# Patient Record
Sex: FEMALE | Race: BLACK OR AFRICAN AMERICAN | URBAN - METROPOLITAN AREA
[De-identification: names, ages, dates, MRNs, and addresses within clinical notes are randomized per-mention and may not be internally consistent; named-entity substitution may affect disease eponyms.]

---

## 2024-01-27 ENCOUNTER — HOSPITAL ENCOUNTER (INPATIENT)
Age: 69
LOS: 3 days | Discharge: HOME HEALTH | DRG: 371 | End: 2024-01-31
Attending: EMERGENCY MEDICINE | Admitting: INTERNAL MEDICINE
Payer: MEDICARE

## 2024-01-27 ENCOUNTER — APPOINTMENT (OUTPATIENT)
Dept: CT IMAGING | Age: 69
DRG: 371 | End: 2024-01-27
Payer: MEDICARE

## 2024-01-27 ENCOUNTER — APPOINTMENT (OUTPATIENT)
Dept: GENERAL RADIOLOGY | Age: 69
DRG: 371 | End: 2024-01-27
Payer: MEDICARE

## 2024-01-27 DIAGNOSIS — I48.91 ATRIAL FIBRILLATION WITH RVR (HCC): Primary | ICD-10-CM

## 2024-01-27 DIAGNOSIS — K52.9 COLITIS: ICD-10-CM

## 2024-01-27 LAB
ALBUMIN SERPL-MCNC: 3.3 G/DL (ref 3.4–5)
ALBUMIN/GLOB SERPL: 1 {RATIO} (ref 1.1–2.2)
ALP SERPL-CCNC: 124 U/L (ref 40–129)
ALT SERPL-CCNC: 11 U/L (ref 10–40)
ANION GAP SERPL CALCULATED.3IONS-SCNC: 11 MMOL/L (ref 3–16)
APTT BLD: 29.8 SEC (ref 22.7–35.9)
AST SERPL-CCNC: 22 U/L (ref 15–37)
BASOPHILS # BLD: 0 K/UL (ref 0–0.2)
BASOPHILS NFR BLD: 0.6 %
BILIRUB SERPL-MCNC: 0.4 MG/DL (ref 0–1)
BUN SERPL-MCNC: 7 MG/DL (ref 7–20)
CALCIUM SERPL-MCNC: 8.9 MG/DL (ref 8.3–10.6)
CHLORIDE SERPL-SCNC: 105 MMOL/L (ref 99–110)
CO2 SERPL-SCNC: 20 MMOL/L (ref 21–32)
CREAT SERPL-MCNC: 0.7 MG/DL (ref 0.6–1.2)
DEPRECATED RDW RBC AUTO: 15.4 % (ref 12.4–15.4)
EOSINOPHIL # BLD: 0.1 K/UL (ref 0–0.6)
EOSINOPHIL NFR BLD: 1.2 %
GFR SERPLBLD CREATININE-BSD FMLA CKD-EPI: >60 ML/MIN/{1.73_M2}
GLUCOSE SERPL-MCNC: 147 MG/DL (ref 70–99)
HCT VFR BLD AUTO: 42 % (ref 36–48)
HEMOCCULT STL QL: NORMAL
HGB BLD-MCNC: 14 G/DL (ref 12–16)
INR PPP: 1.2 (ref 0.84–1.16)
LACTATE BLDV-SCNC: 1.4 MMOL/L (ref 0.4–1.9)
LIPASE SERPL-CCNC: 19 U/L (ref 13–60)
LYMPHOCYTES # BLD: 1.9 K/UL (ref 1–5.1)
LYMPHOCYTES NFR BLD: 36.2 %
MCH RBC QN AUTO: 29.9 PG (ref 26–34)
MCHC RBC AUTO-ENTMCNC: 33.3 G/DL (ref 31–36)
MCV RBC AUTO: 89.9 FL (ref 80–100)
MONOCYTES # BLD: 0.5 K/UL (ref 0–1.3)
MONOCYTES NFR BLD: 8.8 %
NEUTROPHILS # BLD: 2.7 K/UL (ref 1.7–7.7)
NEUTROPHILS NFR BLD: 53.2 %
NT-PROBNP SERPL-MCNC: 3872 PG/ML (ref 0–124)
PLATELET # BLD AUTO: 139 K/UL (ref 135–450)
PMV BLD AUTO: 8.7 FL (ref 5–10.5)
POTASSIUM SERPL-SCNC: 4 MMOL/L (ref 3.5–5.1)
PROT SERPL-MCNC: 6.6 G/DL (ref 6.4–8.2)
PROTHROMBIN TIME: 15.2 SEC (ref 11.5–14.8)
RBC # BLD AUTO: 4.67 M/UL (ref 4–5.2)
SODIUM SERPL-SCNC: 136 MMOL/L (ref 136–145)
TROPONIN, HIGH SENSITIVITY: 21 NG/L (ref 0–14)
WBC # BLD AUTO: 5.2 K/UL (ref 4–11)

## 2024-01-27 PROCEDURE — 85730 THROMBOPLASTIN TIME PARTIAL: CPT

## 2024-01-27 PROCEDURE — 83605 ASSAY OF LACTIC ACID: CPT

## 2024-01-27 PROCEDURE — 2580000003 HC RX 258: Performed by: PHYSICIAN ASSISTANT

## 2024-01-27 PROCEDURE — 6360000004 HC RX CONTRAST MEDICATION: Performed by: PHYSICIAN ASSISTANT

## 2024-01-27 PROCEDURE — 74177 CT ABD & PELVIS W/CONTRAST: CPT

## 2024-01-27 PROCEDURE — 85610 PROTHROMBIN TIME: CPT

## 2024-01-27 PROCEDURE — 6360000002 HC RX W HCPCS: Performed by: PHYSICIAN ASSISTANT

## 2024-01-27 PROCEDURE — 93005 ELECTROCARDIOGRAM TRACING: CPT | Performed by: EMERGENCY MEDICINE

## 2024-01-27 PROCEDURE — 85025 COMPLETE CBC W/AUTO DIFF WBC: CPT

## 2024-01-27 PROCEDURE — 82270 OCCULT BLOOD FECES: CPT

## 2024-01-27 PROCEDURE — 2500000003 HC RX 250 WO HCPCS: Performed by: PHYSICIAN ASSISTANT

## 2024-01-27 PROCEDURE — 99285 EMERGENCY DEPT VISIT HI MDM: CPT

## 2024-01-27 PROCEDURE — 96375 TX/PRO/DX INJ NEW DRUG ADDON: CPT

## 2024-01-27 PROCEDURE — 83880 ASSAY OF NATRIURETIC PEPTIDE: CPT

## 2024-01-27 PROCEDURE — 96374 THER/PROPH/DIAG INJ IV PUSH: CPT

## 2024-01-27 PROCEDURE — 71045 X-RAY EXAM CHEST 1 VIEW: CPT

## 2024-01-27 PROCEDURE — 80053 COMPREHEN METABOLIC PANEL: CPT

## 2024-01-27 PROCEDURE — 83690 ASSAY OF LIPASE: CPT

## 2024-01-27 PROCEDURE — 84484 ASSAY OF TROPONIN QUANT: CPT

## 2024-01-27 RX ORDER — MORPHINE SULFATE 4 MG/ML
4 INJECTION, SOLUTION INTRAMUSCULAR; INTRAVENOUS EVERY 30 MIN PRN
Status: DISCONTINUED | OUTPATIENT
Start: 2024-01-27 | End: 2024-01-28

## 2024-01-27 RX ORDER — DILTIAZEM HYDROCHLORIDE 5 MG/ML
10 INJECTION INTRAVENOUS ONCE
Status: COMPLETED | OUTPATIENT
Start: 2024-01-27 | End: 2024-01-27

## 2024-01-27 RX ORDER — ONDANSETRON 2 MG/ML
4 INJECTION INTRAMUSCULAR; INTRAVENOUS EVERY 6 HOURS PRN
Status: DISCONTINUED | OUTPATIENT
Start: 2024-01-27 | End: 2024-01-28

## 2024-01-27 RX ORDER — 0.9 % SODIUM CHLORIDE 0.9 %
500 INTRAVENOUS SOLUTION INTRAVENOUS ONCE
Status: COMPLETED | OUTPATIENT
Start: 2024-01-27 | End: 2024-01-27

## 2024-01-27 RX ADMIN — ONDANSETRON 4 MG: 2 INJECTION INTRAMUSCULAR; INTRAVENOUS at 22:22

## 2024-01-27 RX ADMIN — DILTIAZEM HYDROCHLORIDE 10 MG: 5 INJECTION, SOLUTION INTRAVENOUS at 23:13

## 2024-01-27 RX ADMIN — IOPAMIDOL 75 ML: 755 INJECTION, SOLUTION INTRAVENOUS at 23:19

## 2024-01-27 RX ADMIN — MORPHINE SULFATE 4 MG: 4 INJECTION, SOLUTION INTRAMUSCULAR; INTRAVENOUS at 22:23

## 2024-01-27 RX ADMIN — SODIUM CHLORIDE 500 ML: 9 INJECTION, SOLUTION INTRAVENOUS at 22:20

## 2024-01-27 ASSESSMENT — LIFESTYLE VARIABLES
HOW OFTEN DO YOU HAVE A DRINK CONTAINING ALCOHOL: NEVER
HOW MANY STANDARD DRINKS CONTAINING ALCOHOL DO YOU HAVE ON A TYPICAL DAY: PATIENT DOES NOT DRINK

## 2024-01-27 ASSESSMENT — PAIN SCALES - GENERAL: PAINLEVEL_OUTOF10: 8

## 2024-01-27 ASSESSMENT — PAIN DESCRIPTION - LOCATION: LOCATION: ABDOMEN;CHEST

## 2024-01-28 PROBLEM — K52.9 COLITIS: Status: ACTIVE | Noted: 2024-01-28

## 2024-01-28 LAB
ANION GAP SERPL CALCULATED.3IONS-SCNC: 9 MMOL/L (ref 3–16)
BACTERIA URNS QL MICRO: NORMAL /HPF
BILIRUB UR QL STRIP.AUTO: NEGATIVE
BUN SERPL-MCNC: 6 MG/DL (ref 7–20)
CALCIUM SERPL-MCNC: 8.4 MG/DL (ref 8.3–10.6)
CHLORIDE SERPL-SCNC: 109 MMOL/L (ref 99–110)
CLARITY UR: CLEAR
CO2 SERPL-SCNC: 20 MMOL/L (ref 21–32)
COLOR UR: YELLOW
CREAT SERPL-MCNC: 0.8 MG/DL (ref 0.6–1.2)
DEPRECATED RDW RBC AUTO: 15.5 % (ref 12.4–15.4)
EKG ATRIAL RATE: 352 BPM
EKG DIAGNOSIS: NORMAL
EKG P AXIS: 94 DEGREES
EKG Q-T INTERVAL: 306 MS
EKG QRS DURATION: 80 MS
EKG QTC CALCULATION (BAZETT): 476 MS
EKG R AXIS: -1 DEGREES
EKG T AXIS: 90 DEGREES
EKG VENTRICULAR RATE: 146 BPM
EPI CELLS #/AREA URNS AUTO: 4 /HPF (ref 0–5)
GFR SERPLBLD CREATININE-BSD FMLA CKD-EPI: >60 ML/MIN/{1.73_M2}
GLUCOSE BLD-MCNC: 114 MG/DL (ref 70–99)
GLUCOSE BLD-MCNC: 81 MG/DL (ref 70–99)
GLUCOSE BLD-MCNC: 93 MG/DL (ref 70–99)
GLUCOSE BLD-MCNC: 93 MG/DL (ref 70–99)
GLUCOSE SERPL-MCNC: 106 MG/DL (ref 70–99)
GLUCOSE UR STRIP.AUTO-MCNC: NEGATIVE MG/DL
HCT VFR BLD AUTO: 40.2 % (ref 36–48)
HGB BLD-MCNC: 13.1 G/DL (ref 12–16)
HGB UR QL STRIP.AUTO: ABNORMAL
HYALINE CASTS #/AREA URNS AUTO: 1 /LPF (ref 0–8)
KETONES UR STRIP.AUTO-MCNC: NEGATIVE MG/DL
LEUKOCYTE ESTERASE UR QL STRIP.AUTO: NEGATIVE
MCH RBC QN AUTO: 29.7 PG (ref 26–34)
MCHC RBC AUTO-ENTMCNC: 32.7 G/DL (ref 31–36)
MCV RBC AUTO: 90.8 FL (ref 80–100)
NITRITE UR QL STRIP.AUTO: NEGATIVE
PERFORMED ON: ABNORMAL
PERFORMED ON: NORMAL
PH UR STRIP.AUTO: 7 [PH] (ref 5–8)
PLATELET # BLD AUTO: 128 K/UL (ref 135–450)
PMV BLD AUTO: 8.3 FL (ref 5–10.5)
POTASSIUM SERPL-SCNC: 3.9 MMOL/L (ref 3.5–5.1)
PROT UR STRIP.AUTO-MCNC: NEGATIVE MG/DL
RBC # BLD AUTO: 4.43 M/UL (ref 4–5.2)
RBC CLUMPS #/AREA URNS AUTO: 2 /HPF (ref 0–4)
SODIUM SERPL-SCNC: 138 MMOL/L (ref 136–145)
SP GR UR STRIP.AUTO: >=1.03 (ref 1–1.03)
T4 FREE SERPL-MCNC: 1.1 NG/DL (ref 0.9–1.8)
TROPONIN, HIGH SENSITIVITY: 20 NG/L (ref 0–14)
TSH SERPL DL<=0.005 MIU/L-ACNC: 4.26 UIU/ML (ref 0.27–4.2)
UA COMPLETE W REFLEX CULTURE PNL UR: ABNORMAL
UA DIPSTICK W REFLEX MICRO PNL UR: YES
URN SPEC COLLECT METH UR: ABNORMAL
UROBILINOGEN UR STRIP-ACNC: 1 E.U./DL
WBC # BLD AUTO: 4.7 K/UL (ref 4–11)
WBC #/AREA URNS AUTO: 2 /HPF (ref 0–5)

## 2024-01-28 PROCEDURE — 84443 ASSAY THYROID STIM HORMONE: CPT

## 2024-01-28 PROCEDURE — 6360000002 HC RX W HCPCS: Performed by: EMERGENCY MEDICINE

## 2024-01-28 PROCEDURE — 2580000003 HC RX 258: Performed by: STUDENT IN AN ORGANIZED HEALTH CARE EDUCATION/TRAINING PROGRAM

## 2024-01-28 PROCEDURE — 6370000000 HC RX 637 (ALT 250 FOR IP): Performed by: STUDENT IN AN ORGANIZED HEALTH CARE EDUCATION/TRAINING PROGRAM

## 2024-01-28 PROCEDURE — 6360000002 HC RX W HCPCS: Performed by: PHYSICIAN ASSISTANT

## 2024-01-28 PROCEDURE — 36415 COLL VENOUS BLD VENIPUNCTURE: CPT

## 2024-01-28 PROCEDURE — 84439 ASSAY OF FREE THYROXINE: CPT

## 2024-01-28 PROCEDURE — 93010 ELECTROCARDIOGRAM REPORT: CPT | Performed by: INTERNAL MEDICINE

## 2024-01-28 PROCEDURE — 99223 1ST HOSP IP/OBS HIGH 75: CPT | Performed by: INTERNAL MEDICINE

## 2024-01-28 PROCEDURE — 2580000003 HC RX 258: Performed by: PHYSICIAN ASSISTANT

## 2024-01-28 PROCEDURE — 6370000000 HC RX 637 (ALT 250 FOR IP): Performed by: PHYSICIAN ASSISTANT

## 2024-01-28 PROCEDURE — 85027 COMPLETE CBC AUTOMATED: CPT

## 2024-01-28 PROCEDURE — 1200000000 HC SEMI PRIVATE

## 2024-01-28 PROCEDURE — 2500000003 HC RX 250 WO HCPCS: Performed by: PHYSICIAN ASSISTANT

## 2024-01-28 PROCEDURE — 81001 URINALYSIS AUTO W/SCOPE: CPT

## 2024-01-28 PROCEDURE — 80048 BASIC METABOLIC PNL TOTAL CA: CPT

## 2024-01-28 RX ORDER — ONDANSETRON 2 MG/ML
4 INJECTION INTRAMUSCULAR; INTRAVENOUS EVERY 6 HOURS PRN
Status: DISCONTINUED | OUTPATIENT
Start: 2024-01-28 | End: 2024-01-31 | Stop reason: HOSPADM

## 2024-01-28 RX ORDER — INSULIN LISPRO 100 [IU]/ML
0-4 INJECTION, SOLUTION INTRAVENOUS; SUBCUTANEOUS NIGHTLY
Status: DISCONTINUED | OUTPATIENT
Start: 2024-01-28 | End: 2024-01-31 | Stop reason: HOSPADM

## 2024-01-28 RX ORDER — DULOXETIN HYDROCHLORIDE 20 MG/1
20 CAPSULE, DELAYED RELEASE ORAL DAILY
COMMUNITY
Start: 2023-11-21

## 2024-01-28 RX ORDER — INSULIN GLARGINE 100 [IU]/ML
15 INJECTION, SOLUTION SUBCUTANEOUS NIGHTLY
Status: DISCONTINUED | OUTPATIENT
Start: 2024-01-28 | End: 2024-01-31 | Stop reason: HOSPADM

## 2024-01-28 RX ORDER — ACETAMINOPHEN 325 MG/1
650 TABLET ORAL EVERY 6 HOURS PRN
Status: DISCONTINUED | OUTPATIENT
Start: 2024-01-28 | End: 2024-01-31 | Stop reason: HOSPADM

## 2024-01-28 RX ORDER — DILTIAZEM HCL IN NACL,ISO-OSM 125 MG/125
2.5-15 PLASTIC BAG, INJECTION (ML) INTRAVENOUS CONTINUOUS
Status: DISCONTINUED | OUTPATIENT
Start: 2024-01-28 | End: 2024-01-28

## 2024-01-28 RX ORDER — ROSUVASTATIN CALCIUM 20 MG/1
20 TABLET, COATED ORAL NIGHTLY
Status: DISCONTINUED | OUTPATIENT
Start: 2024-01-28 | End: 2024-01-31 | Stop reason: HOSPADM

## 2024-01-28 RX ORDER — METOPROLOL SUCCINATE 25 MG/1
25 TABLET, EXTENDED RELEASE ORAL DAILY
Status: DISCONTINUED | OUTPATIENT
Start: 2024-01-28 | End: 2024-01-28

## 2024-01-28 RX ORDER — MORPHINE SULFATE 4 MG/ML
4 INJECTION, SOLUTION INTRAMUSCULAR; INTRAVENOUS
Status: DISCONTINUED | OUTPATIENT
Start: 2024-01-28 | End: 2024-01-31 | Stop reason: HOSPADM

## 2024-01-28 RX ORDER — FLUTICASONE PROPIONATE 50 MCG
1 SPRAY, SUSPENSION (ML) NASAL DAILY
COMMUNITY
Start: 2022-11-23

## 2024-01-28 RX ORDER — DULOXETIN HYDROCHLORIDE 20 MG/1
20 CAPSULE, DELAYED RELEASE ORAL DAILY
Status: DISCONTINUED | OUTPATIENT
Start: 2024-01-28 | End: 2024-01-31 | Stop reason: HOSPADM

## 2024-01-28 RX ORDER — FUROSEMIDE 20 MG/1
20 TABLET ORAL DAILY
Status: DISCONTINUED | OUTPATIENT
Start: 2024-01-28 | End: 2024-01-29

## 2024-01-28 RX ORDER — ASPIRIN 81 MG/1
81 TABLET ORAL DAILY
Status: DISCONTINUED | OUTPATIENT
Start: 2024-01-28 | End: 2024-01-31 | Stop reason: HOSPADM

## 2024-01-28 RX ORDER — LACTOBACILLUS RHAMNOSUS GG 10B CELL
1 CAPSULE ORAL 2 TIMES DAILY WITH MEALS
Status: DISCONTINUED | OUTPATIENT
Start: 2024-01-28 | End: 2024-01-31 | Stop reason: HOSPADM

## 2024-01-28 RX ORDER — METRONIDAZOLE 500 MG/100ML
500 INJECTION, SOLUTION INTRAVENOUS EVERY 8 HOURS
Status: DISCONTINUED | OUTPATIENT
Start: 2024-01-28 | End: 2024-01-30

## 2024-01-28 RX ORDER — SPIRONOLACTONE 25 MG/1
25 TABLET ORAL DAILY
Status: DISCONTINUED | OUTPATIENT
Start: 2024-01-28 | End: 2024-01-31 | Stop reason: HOSPADM

## 2024-01-28 RX ORDER — MORPHINE SULFATE 2 MG/ML
2 INJECTION, SOLUTION INTRAMUSCULAR; INTRAVENOUS
Status: DISCONTINUED | OUTPATIENT
Start: 2024-01-28 | End: 2024-01-31 | Stop reason: HOSPADM

## 2024-01-28 RX ORDER — INSULIN LISPRO 100 [IU]/ML
0-4 INJECTION, SOLUTION INTRAVENOUS; SUBCUTANEOUS
Status: DISCONTINUED | OUTPATIENT
Start: 2024-01-28 | End: 2024-01-31 | Stop reason: HOSPADM

## 2024-01-28 RX ORDER — DILTIAZEM HYDROCHLORIDE 5 MG/ML
5 INJECTION INTRAVENOUS ONCE
Status: DISCONTINUED | OUTPATIENT
Start: 2024-01-28 | End: 2024-01-28

## 2024-01-28 RX ORDER — TRAZODONE HYDROCHLORIDE 100 MG/1
100 TABLET ORAL NIGHTLY
COMMUNITY
Start: 2023-08-25

## 2024-01-28 RX ORDER — SENNOSIDES A AND B 8.6 MG/1
1 TABLET, FILM COATED ORAL DAILY PRN
Status: DISCONTINUED | OUTPATIENT
Start: 2024-01-28 | End: 2024-01-31 | Stop reason: HOSPADM

## 2024-01-28 RX ORDER — 0.9 % SODIUM CHLORIDE 0.9 %
1000 INTRAVENOUS SOLUTION INTRAVENOUS ONCE
Status: COMPLETED | OUTPATIENT
Start: 2024-01-28 | End: 2024-01-28

## 2024-01-28 RX ORDER — FUROSEMIDE 20 MG/1
20 TABLET ORAL DAILY
COMMUNITY
Start: 2021-09-15

## 2024-01-28 RX ORDER — LEVOFLOXACIN 5 MG/ML
750 INJECTION, SOLUTION INTRAVENOUS EVERY 24 HOURS
Status: DISCONTINUED | OUTPATIENT
Start: 2024-01-28 | End: 2024-01-30

## 2024-01-28 RX ORDER — SODIUM CHLORIDE 9 MG/ML
INJECTION, SOLUTION INTRAVENOUS PRN
Status: DISCONTINUED | OUTPATIENT
Start: 2024-01-28 | End: 2024-01-31 | Stop reason: HOSPADM

## 2024-01-28 RX ORDER — HYDROXYZINE HYDROCHLORIDE 25 MG/1
25 TABLET, FILM COATED ORAL EVERY 8 HOURS PRN
Status: DISCONTINUED | OUTPATIENT
Start: 2024-01-28 | End: 2024-01-28

## 2024-01-28 RX ORDER — ROSUVASTATIN CALCIUM 20 MG/1
20 TABLET, COATED ORAL NIGHTLY
COMMUNITY
Start: 2024-01-18

## 2024-01-28 RX ORDER — HYDROXYZINE HYDROCHLORIDE 25 MG/1
25 TABLET, FILM COATED ORAL EVERY 8 HOURS PRN
Status: ON HOLD | COMMUNITY
Start: 2021-05-03 | End: 2024-01-31 | Stop reason: HOSPADM

## 2024-01-28 RX ORDER — FAMOTIDINE 20 MG/1
20 TABLET, FILM COATED ORAL 2 TIMES DAILY
Status: DISCONTINUED | OUTPATIENT
Start: 2024-01-28 | End: 2024-01-31 | Stop reason: HOSPADM

## 2024-01-28 RX ORDER — FLUTICASONE PROPIONATE 50 MCG
1 SPRAY, SUSPENSION (ML) NASAL DAILY
Status: DISCONTINUED | OUTPATIENT
Start: 2024-01-28 | End: 2024-01-31 | Stop reason: HOSPADM

## 2024-01-28 RX ORDER — SPIRONOLACTONE 25 MG/1
25 TABLET ORAL DAILY
COMMUNITY
Start: 2021-08-03

## 2024-01-28 RX ORDER — SODIUM CHLORIDE 0.9 % (FLUSH) 0.9 %
10 SYRINGE (ML) INJECTION PRN
Status: DISCONTINUED | OUTPATIENT
Start: 2024-01-28 | End: 2024-01-31 | Stop reason: HOSPADM

## 2024-01-28 RX ORDER — METRONIDAZOLE 500 MG/100ML
500 INJECTION, SOLUTION INTRAVENOUS ONCE
Status: COMPLETED | OUTPATIENT
Start: 2024-01-28 | End: 2024-01-28

## 2024-01-28 RX ORDER — DEXTROSE MONOHYDRATE 100 MG/ML
INJECTION, SOLUTION INTRAVENOUS CONTINUOUS PRN
Status: DISCONTINUED | OUTPATIENT
Start: 2024-01-28 | End: 2024-01-31 | Stop reason: HOSPADM

## 2024-01-28 RX ORDER — GLUCAGON 1 MG/ML
1 KIT INJECTION PRN
Status: DISCONTINUED | OUTPATIENT
Start: 2024-01-28 | End: 2024-01-31 | Stop reason: HOSPADM

## 2024-01-28 RX ORDER — DILTIAZEM HCL IN NACL,ISO-OSM 125 MG/125
5-15 PLASTIC BAG, INJECTION (ML) INTRAVENOUS CONTINUOUS
Status: CANCELLED | OUTPATIENT
Start: 2024-01-28

## 2024-01-28 RX ORDER — ASPIRIN 81 MG/1
81 TABLET ORAL DAILY
COMMUNITY
Start: 2023-08-25

## 2024-01-28 RX ORDER — SODIUM CHLORIDE 0.9 % (FLUSH) 0.9 %
10 SYRINGE (ML) INJECTION EVERY 12 HOURS SCHEDULED
Status: DISCONTINUED | OUTPATIENT
Start: 2024-01-28 | End: 2024-01-31 | Stop reason: HOSPADM

## 2024-01-28 RX ORDER — MIDODRINE HYDROCHLORIDE 5 MG/1
5 TABLET ORAL
Status: DISCONTINUED | OUTPATIENT
Start: 2024-01-28 | End: 2024-01-31 | Stop reason: HOSPADM

## 2024-01-28 RX ORDER — LEVOFLOXACIN 5 MG/ML
750 INJECTION, SOLUTION INTRAVENOUS ONCE
Status: DISCONTINUED | OUTPATIENT
Start: 2024-01-28 | End: 2024-01-28

## 2024-01-28 RX ORDER — TRAZODONE HYDROCHLORIDE 50 MG/1
100 TABLET ORAL NIGHTLY
Status: DISCONTINUED | OUTPATIENT
Start: 2024-01-28 | End: 2024-01-31 | Stop reason: HOSPADM

## 2024-01-28 RX ORDER — FUROSEMIDE 10 MG/ML
40 INJECTION INTRAMUSCULAR; INTRAVENOUS ONCE
Status: DISCONTINUED | OUTPATIENT
Start: 2024-01-28 | End: 2024-01-29

## 2024-01-28 RX ORDER — ACETAMINOPHEN 650 MG/1
650 SUPPOSITORY RECTAL EVERY 6 HOURS PRN
Status: DISCONTINUED | OUTPATIENT
Start: 2024-01-28 | End: 2024-01-31 | Stop reason: HOSPADM

## 2024-01-28 RX ORDER — METOPROLOL SUCCINATE 25 MG/1
25 TABLET, EXTENDED RELEASE ORAL DAILY
COMMUNITY
Start: 2021-08-03

## 2024-01-28 RX ADMIN — METRONIDAZOLE 500 MG: 500 INJECTION, SOLUTION INTRAVENOUS at 09:01

## 2024-01-28 RX ADMIN — MIDODRINE HYDROCHLORIDE 5 MG: 5 TABLET ORAL at 17:35

## 2024-01-28 RX ADMIN — FAMOTIDINE 20 MG: 20 TABLET ORAL at 20:26

## 2024-01-28 RX ADMIN — MIDODRINE HYDROCHLORIDE 5 MG: 5 TABLET ORAL at 12:11

## 2024-01-28 RX ADMIN — Medication 1 CAPSULE: at 08:59

## 2024-01-28 RX ADMIN — METRONIDAZOLE 500 MG: 500 INJECTION, SOLUTION INTRAVENOUS at 17:35

## 2024-01-28 RX ADMIN — Medication 5 MG/HR: at 04:16

## 2024-01-28 RX ADMIN — METRONIDAZOLE 500 MG: 500 INJECTION, SOLUTION INTRAVENOUS at 01:03

## 2024-01-28 RX ADMIN — SODIUM CHLORIDE 1000 ML: 9 INJECTION, SOLUTION INTRAVENOUS at 10:33

## 2024-01-28 RX ADMIN — DULOXETINE HYDROCHLORIDE 20 MG: 20 CAPSULE, DELAYED RELEASE ORAL at 08:59

## 2024-01-28 RX ADMIN — Medication 10 ML: at 08:59

## 2024-01-28 RX ADMIN — Medication 1 CAPSULE: at 17:35

## 2024-01-28 RX ADMIN — TRAZODONE HYDROCHLORIDE 100 MG: 50 TABLET ORAL at 20:26

## 2024-01-28 RX ADMIN — METOPROLOL TARTRATE 12.5 MG: 25 TABLET, FILM COATED ORAL at 20:26

## 2024-01-28 RX ADMIN — EMPAGLIFLOZIN 10 MG: 10 TABLET, FILM COATED ORAL at 08:58

## 2024-01-28 RX ADMIN — LEVOFLOXACIN 750 MG: 5 INJECTION, SOLUTION INTRAVENOUS at 04:17

## 2024-01-28 RX ADMIN — APIXABAN 5 MG: 5 TABLET, FILM COATED ORAL at 08:59

## 2024-01-28 RX ADMIN — ROSUVASTATIN CALCIUM 20 MG: 20 TABLET, FILM COATED ORAL at 20:26

## 2024-01-28 RX ADMIN — Medication 5 MG/HR: at 01:01

## 2024-01-28 RX ADMIN — Medication 10 ML: at 20:27

## 2024-01-28 RX ADMIN — FAMOTIDINE 20 MG: 20 TABLET ORAL at 08:58

## 2024-01-28 ASSESSMENT — PAIN DESCRIPTION - ORIENTATION
ORIENTATION: LEFT
ORIENTATION: RIGHT

## 2024-01-28 ASSESSMENT — PAIN SCALES - GENERAL
PAINLEVEL_OUTOF10: 0
PAINLEVEL_OUTOF10: 6
PAINLEVEL_OUTOF10: 3
PAINLEVEL_OUTOF10: 0
PAINLEVEL_OUTOF10: 0

## 2024-01-28 ASSESSMENT — PAIN DESCRIPTION - LOCATION
LOCATION: CHEST
LOCATION: ABDOMEN

## 2024-01-28 NOTE — PLAN OF CARE
Problem: Pain  Goal: Verbalizes/displays adequate comfort level or baseline comfort level  1/28/2024 1728 by Rosa Delgadillo, RN  Outcome: Progressing   Pt can rate pain on a 0-10 scale. Pt pain will remain at a level that is tolerable to pt. PRN pain medication as needed.     Problem: Safety - Adult  Goal: Free from fall injury  1/28/2024 1728 by Rosa Delgadillo, RN  Outcome: Progressing   Pt will remain free from falls. Fall precautions in place and alarm engaged. Bedside table and call light within reach. Pt aware to use call light for assistance ambulating.

## 2024-01-28 NOTE — H&P
[facility] Medicine History & Physical      Patient Name: Gera Lamb    : 1955    PCP: Romaine Canada Jr., MD    Date of Service:  Patient seen and examined on 2024     Chief Complaint: Left lower quadrant abdominal pain    History Of Present Illness:    Gera Lamb is a 68 y.o. female who presented to ED for evaluation of sharp, left lower quadrant abdominal pain which began about a day and a half ago.  The patient reports the pain seems to wax and wane on its own.  She reports she had some bright red blood in her stool for the past couple days as well.  She denies any black, tarry stool.  She is anticoagulated on Eliquis for A-fib.  She also reports she has had palpitations for the past 3 to 4 days.  She also reports intermittent chest pain for the past week.  She denies any chest pain or shortness of breath at this time.  She denies fever, cough, nausea, vomiting, diarrhea, urinary symptoms.  She denies any other complaints or concerns at this time.      Past Medical History:    Patient has a past medical history of CHF (congestive heart failure) ([facility]), DM2 (diabetes mellitus, type 2) ([facility]), and Paroxysmal atrial fibrillation ([facility]).    Past Surgical History:    Patient has a past surgical history that includes Coronary artery bypass graft.    Medications Prior to Admission:      Prior to Admission medications    Medication Sig Start Date End Date Taking? Authorizing Provider   aspirin 81 MG EC tablet Take 1 tablet by mouth daily 23  Yes ProviderDarshan MD   hydrOXYzine HCl (ATARAX) 25 MG tablet Take 1 tablet by mouth every 8 hours as needed for Itching 5/3/21  Yes ProviderDarshan MD   apixaban (ELIQUIS) 5 MG TABS tablet Take 1 tablet by mouth 2 times daily 21  Yes Darshan Coleman MD   DULoxetine (CYMBALTA) 20 MG extended release capsule Take 1 capsule by mouth daily 23  Yes Darshan Coleman MD   traZODone (DESYREL) 100 MG tablet Take 1

## 2024-01-28 NOTE — ED PROVIDER NOTES
I personally saw the patient and made/approved the management plan and take responsibility for the patient management.    For further details of Gera Lamb's emergency department encounter, please see the advanced practice provider's documentation.    I, Abebe Dawn MD, am the primary physician provider of record.    CHIEF COMPLAINT  Chief Complaint   Patient presents with    Chest Pain     Patient in via [facility], pt states that she has CP for 1 week. Pt has history of AFIB and CHF. Pt states she also noticed some vaginal bleeding.        Briefly, Gera Lamb is a 68 y.o. female  who presents to the ED complaining of intermittent chest pains for about a week or so but also more recently some generalized abdominal cramping and pain.  Although the triage note mentions vaginal bleeding she tells me that this was actually blood from wiping her bottom after a bowel movement.  She is not sure if she had frankly bloody stool though.  She says it was bright red in color.  Denies any dysuria or hematuria.  She is on Eliquis due to a history of paroxysmal atrial fibrillation.  No fevers.    FOCUSED PHYSICAL EXAMINATION  /81   Pulse 95   Temp 98.7 °F (37.1 °C) (Oral)   Resp 22   Ht 1.575 m (5' 2\")   Wt 59 kg (130 lb)   SpO2 94%   BMI 23.78 kg/m²    Focused physical examination notable for no acute distress, well-appearing, well-nourished, normal speech and mentation without obvious facial droop, no obvious rash.  No obvious cranial nerve deficits on my initial exam.  Mild diffuse nonfocal abdominal tenderness without peritonitis or distention.  Tachycardic, irregularly irregular rhythm, clear to auscultation bilaterally.  No peripheral edema appreciated.      EKG performed in ED:  The 12 lead EKG was interpreted by me independent of a cardiologist as follows:  Rate: tachycardia with a rate of 146  Rhythm: atrial flutter  Axis: normal  Intervals: narrow QRS  ST segments: no ST elevations or 
76 % injection 75 mL (75 mLs IntraVENous Given 1/27/24 0366)             Is this patient to be included in the SEP-1 Core Measure due to severe sepsis or septic shock?   No   Exclusion criteria - the patient is NOT to be included for SEP-1 Core Measure due to:  Infection is not suspected    Chronic Conditions affecting care:   has no past medical history on file.    CONSULTS: (Who and What was discussed)  IP CONSULT TO CARDIOLOGY      Social Determinants Significantly Affecting Health : None    Records Reviewed (External and Source) previous visits from [facility]    CC/HPI Summary, DDx, ED Course, and Reassessment: Briefly, this is a 68-year-old female who presents to the emergency department today for evaluation for concerns of palpitations.  Patient does have a history of A-fib/a flutter she is anticoagulated on Eliquis, and she states that she is feeling palpitations.  No chest pain or shortness of breath.  Patient has been complaining of left lower quadrant abdominal pain since yesterday and has noticed some intermittent bright red blood in the stool.    On examination she is in A-fib with RVR, heart rate in the 120s she does have tenderness noted to her left lower abdomen.  No rebound or guarding.  On  exam stool is light brown in color with what appears to be is some bright red blood.  Good rectal tone.    Disposition Considerations (tests considered but not done, Admit vs D/C, Shared Decision Making, Pt Expectation of Test or Tx.):    EKG as document by my attending, please see his note for further details.  CBC shows no evidence of leukocytosis or anemia.  CMP unremarkable.  Pregnancy is negative.  Lipase normal.  Her troponin is elevated 21 this will be repeat and is negative    BNP is elevated and x-ray does show mild edema, however blood pressure is 108/81, feel that this can be monitored as an inpatient    CT does show colitis, and due to her history of rectal bleeding will cover with Flagyl and

## 2024-01-28 NOTE — CONSULTS
[facility], [facility] [place]   Electrophysiology   Date: 1/28/2024  Reason for Consultation: Atrial fibrillation    Consult Requesting Physician: Will Maki MD     Chief Complaint   Patient presents with    Chest Pain     Patient in via [facility], pt states that she has CP for 1 week. Pt has history of AFIB and CHF. Pt states she also noticed some vaginal bleeding.        CC: Rectal bleeding    HPI: Gera Lamb is a 68 y.o. female who has been presented with abdominal cramping and pain. She also had rectal bleeding. She reports palpitation and shortness of breath.     She has history of CAD s/p CABG (2004), PCI, HFrEF, HTN, DM, COPD, tobacco abuse, atrial fibrillation/flutter s/p cardioversion in 7/2021.     She has a loop recorder and follows up with  cardiology.     She has been admitted for colitis and received IV antibiotic therapy. She has been seen by GI and there is plan for outpatient colonoscopy.     Assessment:   Paroxysmal atrial fibrillation   CAD   s/p CABG x3 (2004)  PCI to SVG 2019   [facility] (7/2021): Severe native 3V CAD. Patent LIMA to LAD. Patent SVG to OM. SVG to PDA is diffusely diseased with 60% stenosis at proximal segment and 50% stenosis at the distal segment   HF with improved ejection fraction   GI bleeding   HTN  DM  Tobacco abuse     Plan:   PAF. Converted to sinus rhythm.   D/c IV cardizem   Continue with Metoprolol.     High risk [id number] score. Anticoagulation is recommended.    Resume anticoagulation with OK with GI and primary team.     Discussed treatment options for Afib. Can consider ablation of atrial fibrillation.     Pro-BNP is elevated and CXR with congestion.   IV lasix today.   Close monitoring of volume status, electrolytes(K, NA), renal function   High risk due to CKD    Jardiance  10 mg/day    Heart failure nurse education and follow up on Monday.     If she would like to follow up with [facility] cardiology will arrange for follow up.     Toprol 
diff, culture, eia if has loose stool to sample  With stable hbg ok resume eliquis from my standpoint if needed per cardiology  Continue solid diet since already tolerating  Pt to call our office to schedule outpatient colonoscopy to be performed 2-3 months to ensure complete resolution/ensure no chronic colitis.    Discussed with patient  Otherwise with stable hbg, 90% improved sx, and tolerating solid food will sign off, call if needed.         James Anand MD , MD  [facility]

## 2024-01-28 NOTE — ED NOTES
Patient oriented to room and ED throughput process.  Safety measures with ED bed locked in lowest position and call light in reach.  Patient educated on all orders, including any medications.  Patient educated on chief complaint/symptoms. Patient encouraged to ask questions regarding care, medications or treatment plan.  Patient aware of how to reach staff with questions/concerns.    
Pt to CT at this time    
Report given to DANA Morales.   
Notable for the following components:    Pro-BNP 3,872 (*)     All other components within normal limits   TROPONIN - Abnormal; Notable for the following components:    Troponin, High Sensitivity 20 (*)     All other components within normal limits     Critical values: no     Abnormal Assessment Findings: Afib with RVR, colitis    Background  History: No past medical history on file.    Assessment    Vitals/MEWS: MEWS Score: 4  Level of Consciousness: Alert (0)   Vitals:    01/28/24 0046 01/28/24 0048 01/28/24 0049 01/28/24 0050   BP:       Pulse: (!) 113 98 (!) 101 95   Resp: 20 25 22 22   Temp:       TempSrc:       SpO2: 93% 94% 95% 94%   Weight:       Height:         FiO2 (%):   O2 Flow Rate:      Cardiac Rhythm: Cardiac Rhythm: A flutter  Pain Assessment:  [] Verbal [] Leonardo Baker Scale  Pain Scale: Pain Assessment  Pain Assessment: 0-10  Pain Level: 6  Patient's Stated Pain Goal: 0 - No pain  Pain Location: Abdomen  Pain Orientation: Left  Last documented pain score (0-10 scale) Pain Level: 6  Last documented pain medication administered:   Mental Status: oriented, alert, coherent, logical, thought processes intact, and able to concentrate and follow conversation  Orientation Level:    NIH Score:    C-SSRS: Risk of Suicide: No Risk  Bedside swallow:    [place] Coma Scale (GCS): Marleni Coma Scale  Eye Opening: Spontaneous  Best Verbal Response: Oriented  Best Motor Response: Obeys commands  [place] Coma Scale Score: 15  Active LDA's:   Peripheral IV 01/27/24 Right Forearm (Active)     PO Status: Nothing by Mouth with sips of water for meds  Pertinent or High Risk Medications/Drips: no   If Yes, please provide details:   Pending Blood Product Administration: no       You may also review the ED PT Care Timeline found under the Summary Nursing Index tab.    Recommendation    Pending orders All ED orders are completed  Plan for Discharge (if known):   Additional Comments:    If any further questions, please call

## 2024-01-29 PROBLEM — I25.10 CORONARY ARTERY DISEASE DUE TO CALCIFIED CORONARY LESION: Status: ACTIVE | Noted: 2024-01-29

## 2024-01-29 PROBLEM — I95.9 HYPOTENSION: Status: ACTIVE | Noted: 2024-01-29

## 2024-01-29 PROBLEM — I48.91 ATRIAL FIBRILLATION WITH RVR (HCC): Status: ACTIVE | Noted: 2024-01-29

## 2024-01-29 PROBLEM — Z72.0 TOBACCO ABUSE: Status: ACTIVE | Noted: 2024-01-29

## 2024-01-29 PROBLEM — I50.22 CHRONIC SYSTOLIC HEART FAILURE (HCC): Status: ACTIVE | Noted: 2024-01-29

## 2024-01-29 PROBLEM — I25.84 CORONARY ARTERY DISEASE DUE TO CALCIFIED CORONARY LESION: Status: ACTIVE | Noted: 2024-01-29

## 2024-01-29 LAB
GLUCOSE BLD-MCNC: 131 MG/DL (ref 70–99)
GLUCOSE BLD-MCNC: 80 MG/DL (ref 70–99)
GLUCOSE BLD-MCNC: 82 MG/DL (ref 70–99)
GLUCOSE BLD-MCNC: 83 MG/DL (ref 70–99)
GLUCOSE BLD-MCNC: 96 MG/DL (ref 70–99)
PERFORMED ON: ABNORMAL
PERFORMED ON: NORMAL

## 2024-01-29 PROCEDURE — 6360000002 HC RX W HCPCS: Performed by: STUDENT IN AN ORGANIZED HEALTH CARE EDUCATION/TRAINING PROGRAM

## 2024-01-29 PROCEDURE — 99233 SBSQ HOSP IP/OBS HIGH 50: CPT | Performed by: CLINICAL NURSE SPECIALIST

## 2024-01-29 PROCEDURE — 1200000000 HC SEMI PRIVATE

## 2024-01-29 PROCEDURE — 6370000000 HC RX 637 (ALT 250 FOR IP): Performed by: PHYSICIAN ASSISTANT

## 2024-01-29 PROCEDURE — 2580000003 HC RX 258: Performed by: PHYSICIAN ASSISTANT

## 2024-01-29 PROCEDURE — 6360000002 HC RX W HCPCS: Performed by: PHYSICIAN ASSISTANT

## 2024-01-29 PROCEDURE — 6370000000 HC RX 637 (ALT 250 FOR IP): Performed by: STUDENT IN AN ORGANIZED HEALTH CARE EDUCATION/TRAINING PROGRAM

## 2024-01-29 RX ORDER — ASPIRIN 81 MG/1
81 TABLET ORAL DAILY
Status: CANCELLED | OUTPATIENT
Start: 2024-01-30

## 2024-01-29 RX ORDER — FUROSEMIDE 10 MG/ML
40 INJECTION INTRAMUSCULAR; INTRAVENOUS ONCE
Status: COMPLETED | OUTPATIENT
Start: 2024-01-29 | End: 2024-01-29

## 2024-01-29 RX ORDER — FUROSEMIDE 20 MG/1
20 TABLET ORAL DAILY
Status: DISCONTINUED | OUTPATIENT
Start: 2024-01-30 | End: 2024-01-31 | Stop reason: HOSPADM

## 2024-01-29 RX ORDER — METOPROLOL SUCCINATE 25 MG/1
25 TABLET, EXTENDED RELEASE ORAL DAILY
Status: DISCONTINUED | OUTPATIENT
Start: 2024-01-29 | End: 2024-01-31 | Stop reason: HOSPADM

## 2024-01-29 RX ADMIN — SODIUM CHLORIDE 25 ML: 9 INJECTION, SOLUTION INTRAVENOUS at 00:25

## 2024-01-29 RX ADMIN — METOPROLOL TARTRATE 12.5 MG: 25 TABLET, FILM COATED ORAL at 09:21

## 2024-01-29 RX ADMIN — SPIRONOLACTONE 25 MG: 25 TABLET, FILM COATED ORAL at 09:21

## 2024-01-29 RX ADMIN — MIDODRINE HYDROCHLORIDE 5 MG: 5 TABLET ORAL at 12:31

## 2024-01-29 RX ADMIN — MIDODRINE HYDROCHLORIDE 5 MG: 5 TABLET ORAL at 18:14

## 2024-01-29 RX ADMIN — INSULIN GLARGINE 15 UNITS: 100 INJECTION, SOLUTION SUBCUTANEOUS at 20:26

## 2024-01-29 RX ADMIN — EMPAGLIFLOZIN 10 MG: 10 TABLET, FILM COATED ORAL at 09:22

## 2024-01-29 RX ADMIN — METRONIDAZOLE 500 MG: 500 INJECTION, SOLUTION INTRAVENOUS at 00:27

## 2024-01-29 RX ADMIN — MIDODRINE HYDROCHLORIDE 5 MG: 5 TABLET ORAL at 09:22

## 2024-01-29 RX ADMIN — SODIUM CHLORIDE: 9 INJECTION, SOLUTION INTRAVENOUS at 09:18

## 2024-01-29 RX ADMIN — ROSUVASTATIN CALCIUM 20 MG: 20 TABLET, FILM COATED ORAL at 20:23

## 2024-01-29 RX ADMIN — Medication 1 CAPSULE: at 18:14

## 2024-01-29 RX ADMIN — FAMOTIDINE 20 MG: 20 TABLET ORAL at 20:23

## 2024-01-29 RX ADMIN — METRONIDAZOLE 500 MG: 500 INJECTION, SOLUTION INTRAVENOUS at 09:28

## 2024-01-29 RX ADMIN — DULOXETINE HYDROCHLORIDE 20 MG: 20 CAPSULE, DELAYED RELEASE ORAL at 09:21

## 2024-01-29 RX ADMIN — TRAZODONE HYDROCHLORIDE 100 MG: 50 TABLET ORAL at 20:23

## 2024-01-29 RX ADMIN — FUROSEMIDE 40 MG: 10 INJECTION, SOLUTION INTRAMUSCULAR; INTRAVENOUS at 09:20

## 2024-01-29 RX ADMIN — Medication 1 CAPSULE: at 09:21

## 2024-01-29 RX ADMIN — METRONIDAZOLE 500 MG: 500 INJECTION, SOLUTION INTRAVENOUS at 18:19

## 2024-01-29 RX ADMIN — Medication 10 ML: at 09:30

## 2024-01-29 RX ADMIN — FAMOTIDINE 20 MG: 20 TABLET ORAL at 09:22

## 2024-01-29 RX ADMIN — Medication 10 ML: at 20:24

## 2024-01-29 RX ADMIN — LEVOFLOXACIN 750 MG: 5 INJECTION, SOLUTION INTRAVENOUS at 04:51

## 2024-01-29 ASSESSMENT — PAIN SCALES - GENERAL: PAINLEVEL_OUTOF10: 0

## 2024-01-29 NOTE — PLAN OF CARE
Problem: Discharge Planning  Goal: Discharge to home or other facility with appropriate resources  Outcome: Progressing     Problem: Pain  Goal: Verbalizes/displays adequate comfort level or baseline comfort level  1/29/2024 0609 by Oscar Garcia RN  Outcome: Progressing  1/28/2024 1728 by Rosa Delgadillo RN  Outcome: Progressing     Problem: Safety - Adult  Goal: Free from fall injury  1/29/2024 0609 by Oscar Garcia RN  Outcome: Progressing  1/28/2024 1728 by Rosa Delgadillo RN  Outcome: Progressing     Problem: ABCDS Injury Assessment  Goal: Absence of physical injury  1/29/2024 0609 by Oscar Garcia RN  Outcome: Progressing  1/28/2024 1728 by Rosa Delgadillo RN  Outcome: Progressing

## 2024-01-29 NOTE — CARE COORDINATION
Discharge Planning Note:  Chart reviewed for discharge needs and it appears that patient has minimal needs for discharge at this time.   Risk Score 9 %     Primary Care Physician is Romiane Canada Jr., MD    Primary insurance is [facility] Medicare    Please notify case management if any discharge needs are identified.      Case management will continue to follow progress and update discharge plan as needed.

## 2024-01-30 LAB
GLUCOSE BLD-MCNC: 112 MG/DL (ref 70–99)
GLUCOSE BLD-MCNC: 120 MG/DL (ref 70–99)
GLUCOSE BLD-MCNC: 90 MG/DL (ref 70–99)
GLUCOSE BLD-MCNC: 93 MG/DL (ref 70–99)
NT-PROBNP SERPL-MCNC: 2217 PG/ML (ref 0–124)
PERFORMED ON: ABNORMAL
PERFORMED ON: ABNORMAL
PERFORMED ON: NORMAL
PERFORMED ON: NORMAL

## 2024-01-30 PROCEDURE — 97162 PT EVAL MOD COMPLEX 30 MIN: CPT

## 2024-01-30 PROCEDURE — 97165 OT EVAL LOW COMPLEX 30 MIN: CPT

## 2024-01-30 PROCEDURE — 6370000000 HC RX 637 (ALT 250 FOR IP): Performed by: INTERNAL MEDICINE

## 2024-01-30 PROCEDURE — 6370000000 HC RX 637 (ALT 250 FOR IP): Performed by: STUDENT IN AN ORGANIZED HEALTH CARE EDUCATION/TRAINING PROGRAM

## 2024-01-30 PROCEDURE — 1200000000 HC SEMI PRIVATE

## 2024-01-30 PROCEDURE — 97116 GAIT TRAINING THERAPY: CPT

## 2024-01-30 PROCEDURE — 6370000000 HC RX 637 (ALT 250 FOR IP): Performed by: PHYSICIAN ASSISTANT

## 2024-01-30 PROCEDURE — 2580000003 HC RX 258: Performed by: PHYSICIAN ASSISTANT

## 2024-01-30 PROCEDURE — 83880 ASSAY OF NATRIURETIC PEPTIDE: CPT

## 2024-01-30 PROCEDURE — 97535 SELF CARE MNGMENT TRAINING: CPT

## 2024-01-30 PROCEDURE — 6360000002 HC RX W HCPCS: Performed by: PHYSICIAN ASSISTANT

## 2024-01-30 PROCEDURE — 6370000000 HC RX 637 (ALT 250 FOR IP): Performed by: CLINICAL NURSE SPECIALIST

## 2024-01-30 PROCEDURE — 83036 HEMOGLOBIN GLYCOSYLATED A1C: CPT

## 2024-01-30 PROCEDURE — 99232 SBSQ HOSP IP/OBS MODERATE 35: CPT | Performed by: CLINICAL NURSE SPECIALIST

## 2024-01-30 PROCEDURE — 97530 THERAPEUTIC ACTIVITIES: CPT

## 2024-01-30 RX ORDER — DULAGLUTIDE 1.5 MG/.5ML
1.5 INJECTION, SOLUTION SUBCUTANEOUS WEEKLY
COMMUNITY
Start: 2023-11-21

## 2024-01-30 RX ORDER — METRONIDAZOLE 250 MG/1
500 TABLET ORAL EVERY 8 HOURS SCHEDULED
Status: DISCONTINUED | OUTPATIENT
Start: 2024-01-30 | End: 2024-01-31 | Stop reason: HOSPADM

## 2024-01-30 RX ORDER — LEVOFLOXACIN 250 MG/1
750 TABLET, FILM COATED ORAL DAILY
Status: DISCONTINUED | OUTPATIENT
Start: 2024-01-31 | End: 2024-01-31 | Stop reason: HOSPADM

## 2024-01-30 RX ADMIN — APIXABAN 5 MG: 5 TABLET, FILM COATED ORAL at 08:26

## 2024-01-30 RX ADMIN — METOPROLOL SUCCINATE 25 MG: 25 TABLET, EXTENDED RELEASE ORAL at 08:27

## 2024-01-30 RX ADMIN — Medication 10 ML: at 21:30

## 2024-01-30 RX ADMIN — Medication 10 ML: at 08:39

## 2024-01-30 RX ADMIN — METRONIDAZOLE 500 MG: 500 INJECTION, SOLUTION INTRAVENOUS at 08:49

## 2024-01-30 RX ADMIN — FAMOTIDINE 20 MG: 20 TABLET ORAL at 08:26

## 2024-01-30 RX ADMIN — FUROSEMIDE 20 MG: 20 TABLET ORAL at 08:26

## 2024-01-30 RX ADMIN — DULOXETINE HYDROCHLORIDE 20 MG: 20 CAPSULE, DELAYED RELEASE ORAL at 08:26

## 2024-01-30 RX ADMIN — MIDODRINE HYDROCHLORIDE 5 MG: 5 TABLET ORAL at 16:32

## 2024-01-30 RX ADMIN — ASPIRIN 81 MG: 81 TABLET, COATED ORAL at 08:26

## 2024-01-30 RX ADMIN — Medication 1 CAPSULE: at 16:32

## 2024-01-30 RX ADMIN — ROSUVASTATIN CALCIUM 20 MG: 20 TABLET, FILM COATED ORAL at 21:30

## 2024-01-30 RX ADMIN — Medication 1 CAPSULE: at 08:26

## 2024-01-30 RX ADMIN — LEVOFLOXACIN 750 MG: 5 INJECTION, SOLUTION INTRAVENOUS at 05:22

## 2024-01-30 RX ADMIN — MIDODRINE HYDROCHLORIDE 5 MG: 5 TABLET ORAL at 12:15

## 2024-01-30 RX ADMIN — TRAZODONE HYDROCHLORIDE 100 MG: 50 TABLET ORAL at 21:30

## 2024-01-30 RX ADMIN — METRONIDAZOLE 500 MG: 500 INJECTION, SOLUTION INTRAVENOUS at 00:46

## 2024-01-30 RX ADMIN — MIDODRINE HYDROCHLORIDE 5 MG: 5 TABLET ORAL at 08:26

## 2024-01-30 RX ADMIN — FLUTICASONE PROPIONATE 1 SPRAY: 50 SPRAY, METERED NASAL at 08:28

## 2024-01-30 RX ADMIN — SPIRONOLACTONE 25 MG: 25 TABLET, FILM COATED ORAL at 08:27

## 2024-01-30 RX ADMIN — FAMOTIDINE 20 MG: 20 TABLET ORAL at 21:30

## 2024-01-30 RX ADMIN — EMPAGLIFLOZIN 10 MG: 10 TABLET, FILM COATED ORAL at 08:27

## 2024-01-30 RX ADMIN — METRONIDAZOLE 500 MG: 250 TABLET ORAL at 14:17

## 2024-01-30 RX ADMIN — METRONIDAZOLE 500 MG: 250 TABLET ORAL at 21:30

## 2024-01-30 RX ADMIN — ONDANSETRON 4 MG: 2 INJECTION INTRAMUSCULAR; INTRAVENOUS at 16:32

## 2024-01-30 RX ADMIN — APIXABAN 5 MG: 5 TABLET, FILM COATED ORAL at 21:30

## 2024-01-30 ASSESSMENT — PAIN SCALES - GENERAL
PAINLEVEL_OUTOF10: 0

## 2024-01-30 NOTE — NURSE NAVIGATOR
[facility]  HEART FAILURE PROGRAM      Gera Lamb 1955    History:  Past Medical History:   Diagnosis Date    CHF (congestive heart failure) ([facility])     DM2 (diabetes mellitus, type 2) ([facility])     Paroxysmal atrial fibrillation ([facility])        ECHO:  6/18/23 at [facility]  Summary:     Overall left ventricular ejection fraction is estimated to be 55-60%.   Left ventricle is normal in size.   Normal left ventricular wall thickness.   Mild mitral regurgitation.     ACE/ARB/ARNi: not on d/t hypotension  BB: toprol xl 25 mg daily  Aldosterone Antagonist: aldactone 25 mg daily  SGLT2: jardiance 10 mg daily    History of sleep apnea: No [place] Screen ordered: Yes    DM History: Yes  Consult for DM educator: Yes      Last Hospital Admission: 6/17/23 at [facility] with humeral surgical neck fracture   Code Status: full   Discharge plans: from home    Family Present: no    Gera Lamb was admitted to the hospital with abdominal pain. Was asked to see patient for education by HF NP. Patient has hx HFimpEF and follows with  cardiology. Patient states she has been instructed to weigh self daily. Does not do this. States she forgets. She states baseline is around 130 lb. Today is 135 lb. Strongly recommended monitoring weights daily. She is able to speak to being aware of 3 lb gain in a day or 5 lb in a week. She usually will take PRN lasix if she notices this.  Patient states she does not always read labels and restrict sodium but is aware she needs to limit her salt intake. She is not always compliant with her medications. When questioned states she will pick them up from the pharmacy and she is just unsure why she forgets or doesn't take. We discussed setting up mediset weekly to help remember or set an alarm to take medications. She also notes lately has not gone to her office visits. Saw cardiology last about a year ago (was to return 3-6 mo) Per EMR though she does see her PCP regularly.    Educated

## 2024-01-30 NOTE — DISCHARGE INSTRUCTIONS
Guidelines for Heart Failure home management:    1. Continue to monitor weight first thing each morning. You should weigh yourself after using the bathroom and before you eat breakfast.    2. Report to your doctor any significant weight change. Remember that weight change of 2-3 lbs. in 1 day or 5 lbs in a week is \"significant\" and likely represents changes in \"fluid\" status.  If you are experiencing any swelling in your feet, ankles or abdomen, or shortness of breath, call your doctor.     3. You should restrict all sodium intake to 3000 milligrams (3 grams) a day. Depending on your status, you may also be asked to restrict fluid intake to no more that 64 oz/2 Liters a day. If uncertain, ask the nurse or physician.     4. Regular aerobic exercise is encouraged 30 minutes a day (walking, bike, swimming, etc.). For specific exercise recommendations, ask your physician.     5. Report to your doctor any change in symptoms (chest pain, worsening shortness of breath, increased dizziness or passing out, increased palpitations or ICD shock, trouble catching breath while lying down, increased edema or abdominal bloating). Remember that even \"minor\" changes in symptoms may be important. Also report any changes in medications including \"over the counter\" medications.     6. DO NOT take NSAID's for pain (i.e, Advil, Aleve, Motrin, ibuprofen, and many more) since these may cause serious problems in those with a history of CHF. If uncertain about the medication, call your doctor.    7. If you have new significant or ongoing diarrhea or vomiting, please call your doctor for further instructions. Taking a diuretic (water pill) with these symptoms can worsen dehydration.     8. If you have any questions or concerns you can always call the CHF  Resource Line( [phone].  It is available Monday thru Friday 8 am- 5 pm. Please leave a message and your call will be returned shortly.     Extra Heart Failure

## 2024-01-31 VITALS
RESPIRATION RATE: 16 BRPM | BODY MASS INDEX: 25.03 KG/M2 | HEART RATE: 60 BPM | DIASTOLIC BLOOD PRESSURE: 82 MMHG | HEIGHT: 62 IN | WEIGHT: 136 LBS | SYSTOLIC BLOOD PRESSURE: 136 MMHG | TEMPERATURE: 97.8 F | OXYGEN SATURATION: 99 %

## 2024-01-31 LAB
EST. AVERAGE GLUCOSE BLD GHB EST-MCNC: 171.4 MG/DL
GLUCOSE BLD-MCNC: 106 MG/DL (ref 70–99)
GLUCOSE BLD-MCNC: 114 MG/DL (ref 70–99)
HBA1C MFR BLD: 7.6 %
PERFORMED ON: ABNORMAL
PERFORMED ON: ABNORMAL

## 2024-01-31 PROCEDURE — 6370000000 HC RX 637 (ALT 250 FOR IP): Performed by: STUDENT IN AN ORGANIZED HEALTH CARE EDUCATION/TRAINING PROGRAM

## 2024-01-31 PROCEDURE — 2580000003 HC RX 258: Performed by: PHYSICIAN ASSISTANT

## 2024-01-31 PROCEDURE — 6370000000 HC RX 637 (ALT 250 FOR IP): Performed by: PHYSICIAN ASSISTANT

## 2024-01-31 PROCEDURE — 6370000000 HC RX 637 (ALT 250 FOR IP): Performed by: INTERNAL MEDICINE

## 2024-01-31 PROCEDURE — 6370000000 HC RX 637 (ALT 250 FOR IP): Performed by: CLINICAL NURSE SPECIALIST

## 2024-01-31 PROCEDURE — 6360000002 HC RX W HCPCS: Performed by: PHYSICIAN ASSISTANT

## 2024-01-31 RX ORDER — MIDODRINE HYDROCHLORIDE 5 MG/1
5 TABLET ORAL
Qty: 90 TABLET | Refills: 3 | Status: SHIPPED | OUTPATIENT
Start: 2024-01-31

## 2024-01-31 RX ORDER — FAMOTIDINE 20 MG/1
20 TABLET, FILM COATED ORAL 2 TIMES DAILY
Qty: 60 TABLET | Refills: 3 | Status: SHIPPED | OUTPATIENT
Start: 2024-01-31

## 2024-01-31 RX ORDER — LEVOFLOXACIN 750 MG/1
750 TABLET, FILM COATED ORAL DAILY
Qty: 10 TABLET | Refills: 0 | Status: SHIPPED | OUTPATIENT
Start: 2024-02-01 | End: 2024-02-11

## 2024-01-31 RX ORDER — METRONIDAZOLE 500 MG/1
500 TABLET ORAL EVERY 8 HOURS SCHEDULED
Qty: 30 TABLET | Refills: 0 | Status: SHIPPED | OUTPATIENT
Start: 2024-01-31 | End: 2024-02-10

## 2024-01-31 RX ORDER — INSULIN GLARGINE 100 [IU]/ML
15 INJECTION, SOLUTION SUBCUTANEOUS NIGHTLY
Qty: 10 ML | Refills: 3 | Status: SHIPPED | OUTPATIENT
Start: 2024-01-31

## 2024-01-31 RX ADMIN — ONDANSETRON 4 MG: 2 INJECTION INTRAMUSCULAR; INTRAVENOUS at 12:09

## 2024-01-31 RX ADMIN — DULOXETINE HYDROCHLORIDE 20 MG: 20 CAPSULE, DELAYED RELEASE ORAL at 08:02

## 2024-01-31 RX ADMIN — ASPIRIN 81 MG: 81 TABLET, COATED ORAL at 08:02

## 2024-01-31 RX ADMIN — FUROSEMIDE 20 MG: 20 TABLET ORAL at 08:01

## 2024-01-31 RX ADMIN — Medication 1 CAPSULE: at 15:10

## 2024-01-31 RX ADMIN — MIDODRINE HYDROCHLORIDE 5 MG: 5 TABLET ORAL at 08:02

## 2024-01-31 RX ADMIN — FLUTICASONE PROPIONATE 1 SPRAY: 50 SPRAY, METERED NASAL at 08:05

## 2024-01-31 RX ADMIN — METRONIDAZOLE 500 MG: 250 TABLET ORAL at 15:10

## 2024-01-31 RX ADMIN — APIXABAN 5 MG: 5 TABLET, FILM COATED ORAL at 08:01

## 2024-01-31 RX ADMIN — METRONIDAZOLE 500 MG: 250 TABLET ORAL at 05:56

## 2024-01-31 RX ADMIN — FAMOTIDINE 20 MG: 20 TABLET ORAL at 08:01

## 2024-01-31 RX ADMIN — Medication 1 CAPSULE: at 08:01

## 2024-01-31 RX ADMIN — Medication 10 ML: at 08:15

## 2024-01-31 RX ADMIN — LEVOFLOXACIN 750 MG: 250 TABLET, FILM COATED ORAL at 08:02

## 2024-01-31 RX ADMIN — EMPAGLIFLOZIN 10 MG: 10 TABLET, FILM COATED ORAL at 08:03

## 2024-01-31 RX ADMIN — SPIRONOLACTONE 25 MG: 25 TABLET, FILM COATED ORAL at 08:03

## 2024-01-31 ASSESSMENT — PAIN DESCRIPTION - DESCRIPTORS
DESCRIPTORS: TENDER
DESCRIPTORS: PRESSURE
DESCRIPTORS: PRESSURE
DESCRIPTORS: TENDER

## 2024-01-31 ASSESSMENT — PAIN DESCRIPTION - ONSET
ONSET: SUDDEN

## 2024-01-31 ASSESSMENT — PAIN DESCRIPTION - FREQUENCY
FREQUENCY: INTERMITTENT

## 2024-01-31 ASSESSMENT — PAIN DESCRIPTION - LOCATION
LOCATION: ABDOMEN
LOCATION: CHEST
LOCATION: CHEST
LOCATION: ABDOMEN

## 2024-01-31 ASSESSMENT — PAIN DESCRIPTION - ORIENTATION
ORIENTATION: LOWER
ORIENTATION: UPPER
ORIENTATION: LOWER
ORIENTATION: UPPER

## 2024-01-31 ASSESSMENT — PAIN SCALES - GENERAL
PAINLEVEL_OUTOF10: 5
PAINLEVEL_OUTOF10: 5
PAINLEVEL_OUTOF10: 2
PAINLEVEL_OUTOF10: 9

## 2024-01-31 ASSESSMENT — PAIN - FUNCTIONAL ASSESSMENT: PAIN_FUNCTIONAL_ASSESSMENT: ACTIVITIES ARE NOT PREVENTED

## 2024-01-31 NOTE — DISCHARGE SUMMARY
Hospital Medicine Discharge Summary    Patient ID: Gera Lamb      Patient's PCP: Romaine Canada Jr., MD    Admit Date: 1/27/2024     Discharge Date:   1/31/2024     Admitting Provider: Matt Musa MD     Discharge Provider: Cristina Beach MD     Discharge Diagnoses:       Active Hospital Problems    Diagnosis     Atrial fibrillation with RVR (HCC) [I48.91]     Coronary artery disease due to calcified coronary lesion [I25.10, I25.84]     Chronic systolic heart failure (HCC) [I50.22]     Hypotension [I95.9]     Tobacco abuse [Z72.0]     Colitis [K52.9]        The patient was seen and examined on day of discharge and this discharge summary is in conjunction with any daily progress note from day of discharge.    Hospital Course:   The patient is a 68-year-old lady with history of CAD status post CABG, COPD, tobacco use, atrial flutter/atrial fibrillation, history of chronic systolic heart failure, who presented with abdominal cramps and pain found to have colitis and acute on chronic systolic CHF.       Colitis: CT imaging noted for wall thickening of the descending and proximal sigmoid colon compatible with colitis.  Still with some left-sided abdominal discomfort though improved.  Remains afebrile with no leukocytosis.  Will continue on Flagyl and Levaquin for 10 more days.  Patient encouraged to take medications with food     Acute on chronic systolic heart failure: Patient reported to have had mild CHF on presentation.  She received diuresis with symptom improvement.  She remains on room air this morning.    Continue oral diuretics with Lasix and Aldactone and SGLT2.  Outpatient follow-up with cardiology and PCP in 2 weeks     Active Problems:    Atrial fibrillation with RVR (HCC): Stable on metoprolol with Eliquis    Coronary artery disease due to calcified coronary lesion: With no chest pains or shortness of breath.  Continue antianginal therapy with aspirin, metoprolol

## 2024-01-31 NOTE — CARE COORDINATION
Case Management Assessment  Initial Evaluation    Date/Time of Evaluation: 1/31/2024 11:45 AM  Assessment Completed by: RUPALI HERNANDEZ RN    If patient is discharged prior to next notation, then this note serves as note for discharge by case management.    Patient Name: Gera Lamb                   YOB: 1955  Diagnosis: Colitis [K52.9]  Atrial fibrillation with RVR (HCC) [I48.91]                   Date / Time: 1/27/2024  9:45 PM    Patient Admission Status: Inpatient   Readmission Risk (Low < 19, Mod (19-27), High > 27): Readmission Risk Score: 7.1    Current PCP: Romaine Canada Jr., MD  PCP verified by ? Yes    Chart Reviewed: Yes      History Provided by: Patient  Patient Orientation: Alert and Oriented, Person, Place, Situation    Patient Cognition: Alert    Hospitalization in the last 30 days (Readmission):  No    If yes, Readmission Assessment in  Navigator will be completed.    Advance Directives:      Code Status: Full Code   Patient's Primary Decision Maker is: Legal Next of Kin      Discharge Planning:    Patient lives with: Alone Type of Home: Apartment  Primary Care Giver: Self  Patient Support Systems include: Family Members   Current Financial resources: Medicaid, Medicare  Current community resources: Other (Comment) (states has [employer]  that is working to get her MOW and any other services she qualifies for)  Current services prior to admission: Durable Medical Equipment            Current DME: Cane, Wheelchair, Other (Comment) (Rollator)            Type of Home Care services:  Nursing Services (patient has [place] RN with her insurance that comes monthly to see her.)    ADLS  Prior functional level: Assistance with the following:, Shopping, Cooking, Housework, Mobility  Current functional level: Assistance with the following:, Housework, Shopping, Mobility    PT AM-PAC: 18 /24  OT AM-PAC: 21 /24    Family can provide assistance at DC: Yes  Would you like Case Management

## 2024-01-31 NOTE — DISCHARGE INSTR - COC
alert, coherent, logical, thought processes intact, and able to concentrate and follow conversation    IV Access:  - None    Nursing Mobility/ADLs:  Walking   Assisted  Transfer  Assisted  Bathing  Independent  Dressing  Independent  Toileting  Independent  Feeding  Independent  Med Admin  Assisted  Med Delivery   whole    Wound Care Documentation and Therapy:        Elimination:  Continence:   Bowel: No  Bladder: No  Urinary Catheter: None   Colostomy/Ileostomy/Ileal Conduit: No       Date of Last BM: 1/30/24      Intake/Output Summary (Last 24 hours) at 1/31/2024 1330  Last data filed at 1/31/2024 0815  Gross per 24 hour   Intake 370 ml   Output 900 ml   Net -530 ml     I/O last 3 completed shifts:  In: 840 [P.O.:840]  Out: 1500 [Urine:1500]    Safety Concerns:     None    Impairments/Disabilities:      None    Nutrition Therapy:  Current Nutrition Therapy:   - Oral Diet:  General    Routes of Feeding: Oral  Liquids: Thin Liquids  Daily Fluid Restriction: no  Last Modified Barium Swallow with Video (Video Swallowing Test): {Done Not Done Date:[id number]}    Treatments at the Time of Hospital Discharge:   Respiratory Treatments:   Oxygen Therapy:  is not on home oxygen therapy.  Ventilator:    - No ventilator support    Rehab Therapies: Physical Therapy and Occupational Therapy  Weight Bearing Status/Restrictions: No weight bearing restrictions  Other Medical Equipment (for information only, NOT a DME order):  walker   Other Treatments:     Patient's personal belongings (please select all that are sent with patient):  Glasses, Jewelry, clothes    RN SIGNATURE:  Electronically signed by Nichole Cuellar RN on 1/31/24 at 2:42 PM EST    CASE MANAGEMENT/SOCIAL WORK SECTION    Inpatient Status Date: 1/28/2024    Readmission Risk Assessment Score:  Readmission Risk              Risk of Unplanned Readmission:  12           Discharging to Facility/ Agency   [facility]  [address],   [place], [place]

## 2024-01-31 NOTE — PROGRESS NOTES
[facility]   Daily Progress Note      Admit Date:  1/27/2024    HPI:    Ms. Lamb is a 68 year old female with history of CAD status post CABG in 2004, PCI, systolic heart failure, DM and COPD, tobacco abuse, AF/flutter status post CV in 7/2021, loop recorder and follows with  cardiology    She presented to the hospital with abdominal cramping and pain.  She is being treated for colitis and IV antibiotics started.  Plans for outpatient colonoscopy.  She had AF and then converted to NSR with cardizem  proBnp 3872 (baseline bnp is 134)and cxr with mild edema    Subjective:  Patient is being seen for chronic systolic heart failure with improved LVEF 55-60%. There were no acute overnight cardiac events.  She is sitting on the side of the bed on 2 L of oxygen and not on this at home.  Her weight is normally 130 (on lasix prn at home and had not been taking the farxiga).  Her weight today by me is 139.  She is not on toprol she continues to be in nsr  Creat 0.8    Objective:   BP 96/70   Pulse 66   Temp 98 °F (36.7 °C) (Temporal)   Resp 16   Ht 1.575 m (5' 2\")   Wt 63 kg (138 lb 14.4 oz)   SpO2 93%   BMI 25.41 kg/m²     Intake/Output Summary (Last 24 hours) at 1/29/2024 0931  Last data filed at 1/29/2024 0623  Gross per 24 hour   Intake 240 ml   Output 425 ml   Net -185 ml          Physical Exam:  General:  Awake, alert, oriented in NAD, thin  Skin:  Warm and dry.  No unusual bruising or rash  Neck:  Supple.  No JVD or carotid bruit appreciated  Chest:  Normal effort.  Clear to auscultation, no wheezes/rhonchi/rales  Cardiovascular:  RRR, S1/S2, no murmur/gallop/rub  Abdomen:  Soft, nontender, +bowel sounds  Extremities:  No edema  Neurological: No focal deficits  Psychological: Normal mood and affect      Medications:    [Held by provider] apixaban  5 mg Oral BID    [Held by provider] aspirin  81 mg Oral Daily    empagliflozin  10 mg Oral Daily    DULoxetine  20 mg Oral Daily    fluticasone  1 
      Hospitalist Progress Note      PCP: Romaine Canada Jr., MD    Date of Admission: 1/27/2024    LOS: 2    Chief Complaint:   Chief Complaint   Patient presents with    Chest Pain     Patient in via [facility], pt states that she has CP for 1 week. Pt has history of AFIB and CHF. Pt states she also noticed some vaginal bleeding.        Case Summary:   68-year-old lady with history of CAD status post CABG, COPD, tobacco use, atrial flutter/atrial fibrillation, history of chronic systolic heart failure, who presented with abdominal cramps and pain found to have colitis and acute on chronic systolic CHF.      Active Hospital Problems    Diagnosis Date Noted    Atrial fibrillation with RVR (HCC) [I48.91] 01/29/2024    Coronary artery disease due to calcified coronary lesion [I25.10, I25.84] 01/29/2024    Chronic systolic heart failure (HCC) [I50.22] 01/29/2024    Hypotension [I95.9] 01/29/2024    Tobacco abuse [Z72.0] 01/29/2024    Colitis [K52.9] 01/28/2024         Principal Problem:    Colitis: CT imaging noted for wall thickening of the descending and proximal sigmoid colon compatible with colitis.  Still with some left-sided abdominal discomfort though improved.  Remains afebrile with no leukocytosis.  - Will switch to oral antibiotics with Flagyl and metronidazole  - Monitor for symptoms  - Continue pain management    Acute on chronic systolic heart failure: Patient reported to have had mild CHF on presentation.  She received diuresis with symptom improvement.  She remains on room air this morning.    -Continue oral diuretics with Lasix and Aldactone  - Continue to monitor renal function and electrolytes on diuretics  - Monitor intake and output and daily weight  - On SGLT2    Active Problems:    Atrial fibrillation with RVR (HCC): Stable on metoprolol with Eliquis    Coronary artery disease due to calcified coronary lesion: With no chest pains or shortness of breath.  Continue antianginal therapy 
      [facility]   Daily Progress Note      Admit Date:  1/27/2024    HPI:    Ms. Lamb is a 68 year old female with history of CAD status post CABG in 2004, PCI, systolic heart failure, DM and COPD, tobacco abuse, AF/flutter status post CV in 7/2021, loop recorder and follows with  cardiology    She presented to the hospital with abdominal cramping and pain.  She is being treated for colitis and IV antibiotics started.  Plans for outpatient colonoscopy.  She had AF and then converted to NSR with cardizem  proBnp 3872 (baseline bnp is 134)and cxr with mild edema    Subjective:  Patient is being seen for chronic systolic heart failure with improved LVEF 55-60%. There were no acute overnight cardiac events.  She is in the bathroom working with PT/OT.  No chest pain, palpitations, lightheadedness, edema or shortness of breath.    Creat 0.8 bnp [id number] weight 135    Objective:   /71   Pulse 66   Temp 97.7 °F (36.5 °C) (Temporal)   Resp 16   Ht 1.575 m (5' 2\")   Wt 61.6 kg (135 lb 12.8 oz)   SpO2 92%   BMI 24.84 kg/m²     Intake/Output Summary (Last 24 hours) at 1/30/2024 0856  Last data filed at 1/30/2024 0519  Gross per 24 hour   Intake 764.12 ml   Output 2425 ml   Net -1660.88 ml          Physical Exam:  General:  Awake, alert, oriented in NAD, thin  Skin:  Warm and dry.  No unusual bruising or rash  Neck:  Supple.  No JVD or carotid bruit appreciated  Chest:  Normal effort.  Clear to auscultation, no wheezes/rhonchi/rales  Cardiovascular:  RRR, S1/S2, no murmur/gallop/rub  Abdomen:  Soft, nontender, +bowel sounds  Extremities:  No edema  Neurological: No focal deficits  Psychological: Normal mood and affect      Medications:    furosemide  20 mg Oral Daily    metoprolol succinate  25 mg Oral Daily    apixaban  5 mg Oral BID    aspirin  81 mg Oral Daily    empagliflozin  10 mg Oral Daily    DULoxetine  20 mg Oral Daily    fluticasone  1 spray Nasal Daily    insulin glargine  15 Units 
    Date of Admission: 1/27/2024. Hospital Day: 3     68 year old female with history of CAD status post CABG in 2004, PCI, systolic heart failure, DM and COPD, tobacco abuse, AF/flutter admitted with colitis. Colitis improving, developed mild chf, getting diuresed, expected dc 1/30 once able to wean off 02    Colitis, suspect bacterial colitis  Improving. Cont Iv ab  Will need OP colonoscopy in 1-2 months    CHF, acute on chronic  Likely 2/2 fluid for colitis. Fluids stopped, started lasix.      Afib RVR  Rate controlled, cont metoprolol. Cont eliquis      Assessment/Plan:      Active Hospital Problems    Diagnosis     Atrial fibrillation with RVR (HCC) [I48.91]     Coronary artery disease due to calcified coronary lesion [I25.10, I25.84]     Chronic systolic heart failure (HCC) [I50.22]     Hypotension [I95.9]     Tobacco abuse [Z72.0]     Colitis [K52.9]            DVT Prophylaxis:    Diet: ADULT DIET; Regular; 4 carb choices (60 gm/meal)  Code Status: Full Code      Dispo - HOME    All  follow up labs and imaging personally reviewed      Chief Complaint:   Chief Complaint   Patient presents with    Chest Pain     Patient in via [facility], pt states that she has CP for 1 week. Pt has history of AFIB and CHF. Pt states she also noticed some vaginal bleeding.          Interval  History:   Patient reports mild chest tightness, on 2 L oxygen.  Chest x-ray revealed mild CHF      Medications:  Reviewed    Infusion Medications    sodium chloride 5 mL/hr at 01/29/24 0918    dextrose       Scheduled Medications    [START ON 1/30/2024] furosemide  20 mg Oral Daily    metoprolol succinate  25 mg Oral Daily    [Held by provider] apixaban  5 mg Oral BID    [Held by provider] aspirin  81 mg Oral Daily    empagliflozin  10 mg Oral Daily    DULoxetine  20 mg Oral Daily    fluticasone  1 spray Nasal Daily    insulin glargine  15 Units SubCUTAneous Nightly    rosuvastatin  20 mg Oral Nightly    spironolactone  25 mg Oral 
  Physician Progress Note      PATIENT:               CHRIS PALOMO  CSN #:                  [id number]  :                       1955  ADMIT DATE:       2024 9:45 PM  DISCH DATE:        2024 4:52 PM  RESPONDING  PROVIDER #:        Cristina Beach MD          QUERY TEXT:    Patient admitted with colitis.   Noted documentation of Acute on chronic   systolic heart failure in IM note on  (335pm) and chronic systolic   CHF/HFrEF in Cardiology query response on  (820am).  If possible, please document in progress notes and discharge summary if you   are evaluating and /or treating any of the following:    The medical record reflects the following:  Risk Factors:69 yo with CHF  Clinical Indicators: Chest xray, Cardiomegaly with mild edema.  BNP 3872,   2217. HR   Cardio query response , \"This patient has chronic systolic CHF/HFrEF.\"  IM note , Acute on chronic systolic heart failure  Treatment: IV lasix x1, Oral lasix  Options provided:  -- Pt being treated for Acute on chronic systolic heart failure  -- Pt being treated for chronic systolic CHF only, Acute on chronic systolic   heart failure ruled out  -- Other - I will add my own diagnosis  -- Disagree - Not applicable / Not valid  -- Disagree - Clinically unable to determine / Unknown  -- Refer to Clinical Documentation Reviewer    PROVIDER RESPONSE TEXT:    After study, Pt being treated for Acute on chronic systolic heart failure.    Query created by: Carmelita Collado on 2024 12:37 PM      Electronically signed by:  Cristina Beach MD 2024 4:59 PM          
  Physician Progress Note      PATIENT:               CHRIS PALOMO  CSN #:                  [id number]  :                       1955  ADMIT DATE:       2024 9:45 PM  DISCH DATE:  RESPONDING  PROVIDER #:        Will Maki MD          QUERY TEXT:    Pt admitted with colitis and started Flagyl and levaquin  . If possible,   please document the type of colitis in the medical record.      The medical record reflects the following:  Risk Factors: 67yo  Clinical Indicators: CT abd, Wall thickening of the descending colon and   proximal sigmoid colon compatible with colitis.  HP, \"Levaquin and flagyl initiated in ED. Will continue\"  Treatment: flagyl, levaquin, GI consult, stool studies, NPO  Options provided:  -- Bacterial Colitis  -- Other - I will add my own diagnosis  -- Disagree - Not applicable / Not valid  -- Disagree - Clinically unable to determine / Unknown  -- Refer to Clinical Documentation Reviewer    PROVIDER RESPONSE TEXT:    This patient has bacterial colitis.    Query created by: Carmelita Collado on 2024 1:26 PM      Electronically signed by:  Will Maki MD 2024 9:50 PM          
  Physician Progress Note      PATIENT:               CHRIS PALOMO  CSN #:                  [id number]  :                       1955  ADMIT DATE:       2024 9:45 PM  DISCH DATE:  RESPONDING  PROVIDER #:        TATYANA ZHONG - [facility]          QUERY TEXT:    Pt admitted with colitis and has CHF documented. If possible, please document   in progress notes and discharge summary further specificity regarding the type   and acuity of CHF:      The medical record reflects the following:  Risk Factors: 67yo with CHF  Clinical Indicators: Chest xray, Cardiomegaly with mild edema.  BNP 3872. HR     Cardio , \"Patient is being seen for chronic systolic heart failure with   improved LVEF 55-60%\"  \"Systolic heart failure with improved LVEF no   ace/arb/arni due to hypotension, on sglt2, aldosterone antagonist and BB\"  HP, \"Chronic diastolic CHF- Appears compensated\"  Treatment: Lasix, daily wts, CHF nurse  Options provided:  -- Acute on Chronic Systolic CHF/HFrEF  -- Chronic Systolic CHF/HFrEF  -- Other - I will add my own diagnosis  -- Disagree - Not applicable / Not valid  -- Disagree - Clinically unable to determine / Unknown  -- Refer to Clinical Documentation Reviewer    PROVIDER RESPONSE TEXT:    This patient has chronic systolic CHF/HFrEF.    Query created by: Carmelita Collado on 2024 1:39 PM      Electronically signed by:  TATYANA ZHONG - CNS 2024 8:19 AM          
  [facility] - Inpatient Rehabilitation Department   Phone: [phone]    Occupational Therapy    [x] Initial Evaluation            [] Daily Treatment Note         [] Discharge Summary      Patient: Gera Lamb   : 1955   MRN: [id number]   Date of Service:  2024    Admitting Diagnosis:  Colitis  Current Admission Summary: Briefly, Gera Lamb is a 68 y.o. female  who presents to the ED complaining of intermittent chest pains for about a week or so but also more recently some generalized abdominal cramping and pain.  Although the triage note mentions vaginal bleeding she tells me that this was actually blood from wiping her bottom after a bowel movement.  She is not sure if she had frankly bloody stool though.  She says it was bright red in color.  Denies any dysuria or hematuria.  She is on Eliquis due to a history of paroxysmal atrial fibrillation.  No fevers.   Found to have colitis and be in a-fib with RVR.   Past Medical History:  has a past medical history of CHF (congestive heart failure) ([facility]), DM2 (diabetes mellitus, type 2) ([facility]), and Paroxysmal atrial fibrillation ([facility]).  Past Surgical History:  has a past surgical history that includes Coronary artery bypass graft.    Discharge Recommendations: Gera Lamb scored a 21/24 on the AM-PAC ADL Inpatient form. Current research shows that an AM-PAC score of 18 or greater is typically associated with a discharge to the patient's home setting. Based on the patient's AM-PAC score, and their current ADL deficits, it is recommended that the patient have 2-3 sessions per week of Occupational Therapy at d/c to increase the patient's independence.  At this time, this patient demonstrates the endurance and safety to discharge home with HHOT (home vs OP services) and a follow up treatment frequency of 2-3x/wk.   Please see assessment section for further patient specific details.    HOME HEALTH CARE: LEVEL 1 STANDARD    - Initial home 
4 Eyes Skin Assessment     NAME:  Gera Lamb  YOB: 1955  MEDICAL RECORD NUMBER:  [id number]    The patient is being assessed for  Transfer to New Unit    I agree that at least one RN has performed a thorough Head to Toe Skin Assessment on the patient. ALL assessment sites listed below have been assessed.      Areas assessed by both nurses:    Head, Face, Ears, Shoulders, Back, Chest, Arms, Elbows, Hands, Sacrum. Buttock, Coccyx, Ischium, Legs. Feet and Heels, Under Medical Devices , and Other          Does the Patient have a Wound? No noted wound(s)       Florentin Prevention initiated by RN: No  Wound Care Orders initiated by RN: No    Pressure Injury (Stage 3,4, Unstageable, DTI, NWPT, and Complex wounds) if present, place Wound referral order by RN under : No    New Ostomies, if present place, Ostomy referral order under : No     Nurse 1 eSignature: Electronically signed by Nichole Cuellar RN on 1/30/24 at 4:40 PM EST    **SHARE this note so that the co-signing nurse can place an eSignature**    Nurse 2 eSignature: Electronically signed by Fatoumata Decker RN on 1/30/24 at 9:57 PM EST   
CLINICAL PHARMACY NOTE: MEDS TO BEDS    Total # of Prescriptions Filled: 5   The following medications were delivered to the patient:  FAMOTIDINE 20MG  METRONIDAZOLE 500MG  LEVOFLOXACIN 750MG  JARDIANCE 10MG  MIDODRINE HCL 5MG    Additional Documentation:  DANA Varela picked up in outpatient pharmacy = signed  Claudia Gandhi - Pharmacy Tech.   
Nutrition Note    RECOMMENDATIONS  PO Diet: Continue current diet   ONS: Offer Glucerna BID  Nutrition Support: None      NUTRITION ASSESSMENT   Pt triggered positive nursing nutrition screen for MST 4. Pt reports poor PO intake d/t taste changes that started after she had Covid last year. Pt reports she weighed 200 lbs when becoming ill with Covid. However, per chart review in care everywhere, pt's weight throughout 2023 was 148-150 lbs.  lbs. Weight loss not considered significant per AND/ASPEN guidelines. Pt states PO intake during admission has never been more than 1/3 of meal. Nursing has documented 1 meal during admission at 26-50%; unsure how pt has eaten otherwise. Pt was seen by cardiology NP (Lavonne) this morning but has no recollection of discussing CHF. RD offered CHF diet education but pt stated, \"it doesn't matter because I don't eat anyway.\" Agreeable to have CHF diet handout left at bedside.     Nutrition Related Findings: No edema noted. Appears adequately nourished.  Wounds: None  Nutrition Education:  Education initiated   Nutrition Goals: PO intake 50% or greater, prior to discharge     MALNUTRITION ASSESSMENT   Chronic Illness  Malnutrition Status: At risk for malnutrition (Comment)  Findings of the 6 clinical characteristics of malnutrition:  Energy Intake:  75% or less estimated energy requirements for 1 month or longer (per pt report)  Weight Loss:  No significant weight loss     Body Fat Loss:  No significant body fat loss     Muscle Mass Loss:  No significant muscle mass loss    Fluid Accumulation:  No significant fluid accumulation     Strength:  Not Performed      NUTRITION DIAGNOSIS   Inadequate protein-energy intake related to inadequate protein-energy intake as evidenced by intake 26-50%    CURRENT NUTRITION THERAPIES  ADULT DIET; Regular; 4 carb choices (60 gm/meal)     PO Intake: 26-50%   PO Supplement Intake:None Ordered    ANTHROPOMETRICS  Current Height: 157.5 cm (5' 
Patient transferred from [address] [place] [address]. Vitals obtained. Scheduled meds given. Four eyes skin assessment done. Head to toe completed. Alert and oriented. Iv lines checked. No further needs expressed.   
Pt awake in bed watching tv. VSS and fresh water provided. Pt denies any needs. Call light in reach and bed alarm engaged.   
Pt awake in bed watching tv. VSS and pt denies any needs. Call light in reach and bed alarm engaged.   
Pt awake in bed watching tv. VSS, BP low and hospitalist made aware. Assessment complete and medications given. Denies pain or any other needs. Call light in reach and bed alarm engaged.   
Report given to 5 Erika RN   
[facility]  Diabetes Education   Progress Note       NAME:  Gera Lamb  MEDICAL RECORD NUMBER:  [id number]  AGE: 68 y.o.   GENDER: female  : 1955  TODAY'S DATE:  2024    Subjective   Reason for Diabetes Education Evaluation and Assessment: hemoglobin A1C 10.7 on Dr appointment 23, CHF patient.    the patient states she;   Takes trulicity 1.5 mg every Friday if she doesn't forget.  Takes lantus 15 units nightly after PCP increased dose from 10 units on 24.  Takes dapagliflozin daily if she doesn't forget.  Does not take metformin due to GI side effects.  Checks her blood sugar once daily most days.  Just got a CGM but doesn't know how to use it.    Given pill organizer by [facility] to promote medication compliance.    Visit Type: evaluation      Gera Lamb is a 68 y.o. female referred by:  [] Physician  [x] Nursing  [] Chart Review   [] Other:     PAST MEDICAL HISTORY        Diagnosis Date    CHF (congestive heart failure) (HCC)     DM2 (diabetes mellitus, type 2) (HCC)     Paroxysmal atrial fibrillation (HCC)        PAST SURGICAL HISTORY    Past Surgical History:   Procedure Laterality Date    CORONARY ARTERY BYPASS GRAFT         FAMILY HISTORY    Family History   Problem Relation Age of Onset    Asthma Mother     Cancer Mother     Heart Failure Mother     Hypertension Father     Diabetes Father     Heart Disease Father     Hypertension Brother     High Cholesterol Brother     Heart Disease Brother     Diabetes Paternal Grandmother        SOCIAL HISTORY    Social History     Tobacco Use    Smoking status: Every Day     Types: Cigarettes    Smokeless tobacco: Never   Substance Use Topics    Alcohol use: Yes     Comment: occasional    Drug use: Never       ALLERGIES    Allergies   Allergen Reactions    Lisinopril Cough       MEDICATIONS     [START ON 2024] levoFLOXacin  750 mg Oral Daily    metroNIDAZOLE  500 mg Oral 3 times per day    furosemide  20 mg Oral 
Shift assessment completed. Routine vitals obtained. Scheduled medications given. Patient is awake, alert and oriented.  Patient is resting comfortably at this time. Call light within reach. Iv lines flushed and capped. No further needs expressed.     
This patient has had a discharge order placed. They are returning home and being picked up in the lobby by son and daughter. Discharge paperwork has been printed, highlighted, and gone over with the patient by this RN. Patient understands teaching and has no further questions at this time. IV has been removed with no complications. Telemetry has been removed. Pt has all belongings present. No further needs expressed.  
evaluation to occur within 24-48 hours, in patient home   - Therapy to evaluate with goal of regaining prior level of functioning   - Therapy to evaluate if patient has Home Health Aide needs for personal care    If patient discharges prior to next session this note will serve as a discharge summary.  Please see below for the latest assessment towards goals.      DME Required For Discharge: patient has all required DME for discharge  Precautions/Restrictions: high fall risk, Isolation precautions  Weight Bearing Restrictions: no restrictions  [] Right Upper Extremity  [] Left Upper Extremity [] Right Lower Extremity  [] Left Lower Extremity     Required Braces/Orthotics: no braces required   [] Right  [] Left  Positional Restrictions:no positional restrictions    Pre-Admission Information   Lives With: alone                     Type of Home: apartment  Home Layout: two level  Home Access: elevator  Bathroom Layout: tub/shower unit  Bathroom Equipment: grab bars in shower, grab bars around toilet, shower chair, hand held shower head  Toilet Height: standard height  Home Equipment: rollator - 4 wheeled walker, single point cane, manual wheelchair  Transfer Assistance: modified independent with use of SPC  Ambulation Assistance:modified independent with use of cane in apartment, walker or w/c in community, depending on fatigue  ADL Assistance: requires assistance with grooming, sometimes granddaughter comes over to help brush hair or get out of the shower. Granddaughter comes over once every two weeks  IADL Assistance: independent with homemaking tasks, struggles to do laundry because she can't open dryer door. Waits for someone to enter to help her  Active :        [] Yes                 [x] No  Hand Dominance: [] Left                 [x] Right  Current Employment: retired.  Occupation: Retail  Hobbies: Coloring  Recent Falls: 1 fall in late June resulting in fx shoulder    Examination   Vision:   Vision

## 2024-01-31 NOTE — CARE COORDINATION
CM spoke to Cindy at [facility] and out of network with pt's dual [facility] policy.    Cm spoke to Roro with [email or web address] [facility] home care [phone] and she accepted patient and patient is for a start of care tomorrow.     CM called unit to update RN who will let pt know.     Patient discharged 1/31/2024 to home with [facility].  All discharge needs met per case management.    Ruthy Whitt RN, BSN  [phone]

## 2024-02-01 ENCOUNTER — TELEPHONE (OUTPATIENT)
Dept: OTHER | Age: 69
End: 2024-02-01

## 2024-02-01 NOTE — TELEPHONE ENCOUNTER
[facility]  HEART FAILURE PROGRAM  TELEPHONE ENCOUNTER FORM    Gera Lamb 1955    ASSESSMENT:   Baseline weight: 132 lbs  Current weight: 132 lbs  Patient weighing daily: Yes  What are your symptoms of heart failure: dyspnea, edema  Are you having any symptoms:  No  Patient knows who to call with symptoms: Yes  Diet history:      Patient states sodium limitation is : 3000 mg      Patient states fluid limitation is 64 oz  Patient following diet as instructed: Yes  Medication history: taking medications as instructed Yes; medication side effects noted No  Patient is being active at home: Yes  Patient knows date and time of follow up appointment: Yes   Patient has transportation to appointments: Yes    RECOMMENDATIONS:   Medication: taking as prescribed  Diet: no added salt diet  MD/ Clinic appointment: tomorrow with cardiology  Other:  Patient doing well. Denies any shortness of breath or edema. Encouraged her to call with any questions or concerns.

## 2024-06-19 ENCOUNTER — HOSPITAL ENCOUNTER (INPATIENT)
Age: 69
LOS: 5 days | Discharge: HOME | DRG: 308 | End: 2024-06-24
Attending: EMERGENCY MEDICINE | Admitting: INTERNAL MEDICINE
Payer: COMMERCIAL

## 2024-06-19 ENCOUNTER — APPOINTMENT (OUTPATIENT)
Dept: GENERAL RADIOLOGY | Age: 69
DRG: 308 | End: 2024-06-19
Payer: COMMERCIAL

## 2024-06-19 ENCOUNTER — APPOINTMENT (OUTPATIENT)
Dept: CT IMAGING | Age: 69
DRG: 308 | End: 2024-06-19
Payer: COMMERCIAL

## 2024-06-19 DIAGNOSIS — K52.9 COLITIS: ICD-10-CM

## 2024-06-19 DIAGNOSIS — R09.02 HYPOXIA: ICD-10-CM

## 2024-06-19 DIAGNOSIS — I48.0 PAROXYSMAL ATRIAL FIBRILLATION (HCC): ICD-10-CM

## 2024-06-19 DIAGNOSIS — K82.1 HYDROPS OF GALLBLADDER: ICD-10-CM

## 2024-06-19 DIAGNOSIS — R91.8 GROUND GLASS OPACITY PRESENT ON IMAGING OF LUNG: ICD-10-CM

## 2024-06-19 DIAGNOSIS — I48.91 ATRIAL FIBRILLATION WITH RVR (HCC): Primary | ICD-10-CM

## 2024-06-19 DIAGNOSIS — R79.89 ELEVATED BRAIN NATRIURETIC PEPTIDE (BNP) LEVEL: ICD-10-CM

## 2024-06-19 DIAGNOSIS — R11.2 NAUSEA AND VOMITING, UNSPECIFIED VOMITING TYPE: ICD-10-CM

## 2024-06-19 LAB
ALBUMIN SERPL-MCNC: 3.6 G/DL (ref 3.4–5)
ALBUMIN/GLOB SERPL: 1 {RATIO} (ref 1.1–2.2)
ALP SERPL-CCNC: 116 U/L (ref 40–129)
ALT SERPL-CCNC: <5 U/L (ref 10–40)
ANION GAP SERPL CALCULATED.3IONS-SCNC: 17 MMOL/L (ref 3–16)
ANTI-XA UNFRAC HEPARIN: >1.1 IU/ML (ref 0.3–0.7)
AST SERPL-CCNC: 15 U/L (ref 15–37)
BASE EXCESS BLDV CALC-SCNC: -4.6 MMOL/L
BASOPHILS # BLD: 0 K/UL (ref 0–0.2)
BASOPHILS NFR BLD: 0.3 %
BILIRUB SERPL-MCNC: 0.8 MG/DL (ref 0–1)
BILIRUB UR QL STRIP.AUTO: NEGATIVE
BUN SERPL-MCNC: 25 MG/DL (ref 7–20)
CALCIUM SERPL-MCNC: 9 MG/DL (ref 8.3–10.6)
CHLORIDE SERPL-SCNC: 102 MMOL/L (ref 99–110)
CLARITY UR: CLEAR
CO2 BLDV-SCNC: 22 MMOL/L
CO2 SERPL-SCNC: 18 MMOL/L (ref 21–32)
COHGB MFR BLDV: 3.6 %
COLOR UR: YELLOW
CREAT SERPL-MCNC: 1.2 MG/DL (ref 0.6–1.2)
D-DIMER QUANTITATIVE: 2.92 UG/ML FEU (ref 0–0.6)
DEPRECATED RDW RBC AUTO: 16.1 % (ref 12.4–15.4)
EOSINOPHIL # BLD: 0.1 K/UL (ref 0–0.6)
EOSINOPHIL NFR BLD: 0.9 %
GFR SERPLBLD CREATININE-BSD FMLA CKD-EPI: 49 ML/MIN/{1.73_M2}
GLUCOSE BLD-MCNC: 171 MG/DL (ref 70–99)
GLUCOSE SERPL-MCNC: 131 MG/DL (ref 70–99)
GLUCOSE UR STRIP.AUTO-MCNC: NEGATIVE MG/DL
HCO3 BLDV-SCNC: 21 MMOL/L (ref 23–29)
HCT VFR BLD AUTO: 44.2 % (ref 36–48)
HGB BLD-MCNC: 13.9 G/DL (ref 12–16)
HGB UR QL STRIP.AUTO: NEGATIVE
KETONES UR STRIP.AUTO-MCNC: NEGATIVE MG/DL
LACTATE BLDV-SCNC: 1.7 MMOL/L (ref 0.4–1.9)
LACTATE BLDV-SCNC: 2.6 MMOL/L (ref 0.4–1.9)
LEUKOCYTE ESTERASE UR QL STRIP.AUTO: NEGATIVE
LYMPHOCYTES # BLD: 2.9 K/UL (ref 1–5.1)
LYMPHOCYTES NFR BLD: 31.9 %
MCH RBC QN AUTO: 29.5 PG (ref 26–34)
MCHC RBC AUTO-ENTMCNC: 31.4 G/DL (ref 31–36)
MCV RBC AUTO: 94 FL (ref 80–100)
METHGB MFR BLDV: 0.7 %
MONOCYTES # BLD: 1.1 K/UL (ref 0–1.3)
MONOCYTES NFR BLD: 12.5 %
NEUTROPHILS # BLD: 4.9 K/UL (ref 1.7–7.7)
NEUTROPHILS NFR BLD: 54.4 %
NITRITE UR QL STRIP.AUTO: NEGATIVE
NT-PROBNP SERPL-MCNC: ABNORMAL PG/ML (ref 0–124)
O2 THERAPY: ABNORMAL
PCO2 BLDV: 37.5 MMHG (ref 40–50)
PERFORMED ON: ABNORMAL
PH BLDV: 7.35 [PH] (ref 7.35–7.45)
PH UR STRIP.AUTO: 6.5 [PH] (ref 5–8)
PLATELET # BLD AUTO: 161 K/UL (ref 135–450)
PMV BLD AUTO: 9.7 FL (ref 5–10.5)
PO2 BLDV: <30 MMHG
POTASSIUM SERPL-SCNC: 3.8 MMOL/L (ref 3.5–5.1)
PROCALCITONIN SERPL IA-MCNC: 0.08 NG/ML (ref 0–0.15)
PROT SERPL-MCNC: 7.3 G/DL (ref 6.4–8.2)
PROT UR STRIP.AUTO-MCNC: NEGATIVE MG/DL
RBC # BLD AUTO: 4.7 M/UL (ref 4–5.2)
REASON FOR REJECTION: NORMAL
REJECTED TEST: NORMAL
SAO2 % BLDV: 44 %
SARS-COV-2 RDRP RESP QL NAA+PROBE: NOT DETECTED
SODIUM SERPL-SCNC: 137 MMOL/L (ref 136–145)
SP GR UR STRIP.AUTO: 1.01 (ref 1–1.03)
TROPONIN, HIGH SENSITIVITY: 31 NG/L (ref 0–14)
TROPONIN, HIGH SENSITIVITY: 33 NG/L (ref 0–14)
UA COMPLETE W REFLEX CULTURE PNL UR: NORMAL
UA DIPSTICK W REFLEX MICRO PNL UR: NORMAL
URN SPEC COLLECT METH UR: NORMAL
UROBILINOGEN UR STRIP-ACNC: 0.2 E.U./DL
WBC # BLD AUTO: 9.1 K/UL (ref 4–11)

## 2024-06-19 PROCEDURE — 2060000000 HC ICU INTERMEDIATE R&B

## 2024-06-19 PROCEDURE — 71045 X-RAY EXAM CHEST 1 VIEW: CPT

## 2024-06-19 PROCEDURE — 82803 BLOOD GASES ANY COMBINATION: CPT

## 2024-06-19 PROCEDURE — 36415 COLL VENOUS BLD VENIPUNCTURE: CPT

## 2024-06-19 PROCEDURE — 85520 HEPARIN ASSAY: CPT

## 2024-06-19 PROCEDURE — 2580000003 HC RX 258: Performed by: NURSE PRACTITIONER

## 2024-06-19 PROCEDURE — 96374 THER/PROPH/DIAG INJ IV PUSH: CPT

## 2024-06-19 PROCEDURE — 6360000002 HC RX W HCPCS: Performed by: EMERGENCY MEDICINE

## 2024-06-19 PROCEDURE — 83605 ASSAY OF LACTIC ACID: CPT

## 2024-06-19 PROCEDURE — 81003 URINALYSIS AUTO W/O SCOPE: CPT

## 2024-06-19 PROCEDURE — 6360000004 HC RX CONTRAST MEDICATION: Performed by: EMERGENCY MEDICINE

## 2024-06-19 PROCEDURE — 85379 FIBRIN DEGRADATION QUANT: CPT

## 2024-06-19 PROCEDURE — 6360000002 HC RX W HCPCS: Performed by: INTERNAL MEDICINE

## 2024-06-19 PROCEDURE — 93005 ELECTROCARDIOGRAM TRACING: CPT | Performed by: EMERGENCY MEDICINE

## 2024-06-19 PROCEDURE — 87635 SARS-COV-2 COVID-19 AMP PRB: CPT

## 2024-06-19 PROCEDURE — 87040 BLOOD CULTURE FOR BACTERIA: CPT

## 2024-06-19 PROCEDURE — 99285 EMERGENCY DEPT VISIT HI MDM: CPT

## 2024-06-19 PROCEDURE — 84145 PROCALCITONIN (PCT): CPT

## 2024-06-19 PROCEDURE — 74177 CT ABD & PELVIS W/CONTRAST: CPT

## 2024-06-19 PROCEDURE — 2500000003 HC RX 250 WO HCPCS: Performed by: EMERGENCY MEDICINE

## 2024-06-19 PROCEDURE — 2580000003 HC RX 258: Performed by: EMERGENCY MEDICINE

## 2024-06-19 PROCEDURE — 80053 COMPREHEN METABOLIC PANEL: CPT

## 2024-06-19 PROCEDURE — 6370000000 HC RX 637 (ALT 250 FOR IP): Performed by: INTERNAL MEDICINE

## 2024-06-19 PROCEDURE — 85025 COMPLETE CBC W/AUTO DIFF WBC: CPT

## 2024-06-19 PROCEDURE — 83880 ASSAY OF NATRIURETIC PEPTIDE: CPT

## 2024-06-19 PROCEDURE — 84484 ASSAY OF TROPONIN QUANT: CPT

## 2024-06-19 PROCEDURE — 71260 CT THORAX DX C+: CPT

## 2024-06-19 RX ORDER — FUROSEMIDE 10 MG/ML
20 INJECTION INTRAMUSCULAR; INTRAVENOUS 2 TIMES DAILY
Status: DISCONTINUED | OUTPATIENT
Start: 2024-06-20 | End: 2024-06-20

## 2024-06-19 RX ORDER — 0.9 % SODIUM CHLORIDE 0.9 %
500 INTRAVENOUS SOLUTION INTRAVENOUS ONCE
Status: COMPLETED | OUTPATIENT
Start: 2024-06-19 | End: 2024-06-20

## 2024-06-19 RX ORDER — GLUCAGON 1 MG/ML
1 KIT INJECTION PRN
Status: DISCONTINUED | OUTPATIENT
Start: 2024-06-19 | End: 2024-06-24 | Stop reason: HOSPADM

## 2024-06-19 RX ORDER — LEVOFLOXACIN 5 MG/ML
500 INJECTION, SOLUTION INTRAVENOUS EVERY 24 HOURS
Status: DISCONTINUED | OUTPATIENT
Start: 2024-06-20 | End: 2024-06-19

## 2024-06-19 RX ORDER — INSULIN LISPRO 100 [IU]/ML
0-4 INJECTION, SOLUTION INTRAVENOUS; SUBCUTANEOUS
Status: DISCONTINUED | OUTPATIENT
Start: 2024-06-20 | End: 2024-06-24 | Stop reason: HOSPADM

## 2024-06-19 RX ORDER — DULOXETIN HYDROCHLORIDE 20 MG/1
20 CAPSULE, DELAYED RELEASE ORAL DAILY
Status: DISCONTINUED | OUTPATIENT
Start: 2024-06-20 | End: 2024-06-24 | Stop reason: HOSPADM

## 2024-06-19 RX ORDER — DIGOXIN 125 MCG
125 TABLET ORAL DAILY
Status: DISCONTINUED | OUTPATIENT
Start: 2024-06-20 | End: 2024-06-24 | Stop reason: HOSPADM

## 2024-06-19 RX ORDER — LEVOFLOXACIN 5 MG/ML
750 INJECTION, SOLUTION INTRAVENOUS ONCE
Status: COMPLETED | OUTPATIENT
Start: 2024-06-19 | End: 2024-06-19

## 2024-06-19 RX ORDER — POLYETHYLENE GLYCOL 3350 17 G/17G
17 POWDER, FOR SOLUTION ORAL DAILY PRN
Status: DISCONTINUED | OUTPATIENT
Start: 2024-06-19 | End: 2024-06-24 | Stop reason: HOSPADM

## 2024-06-19 RX ORDER — DEXTROSE MONOHYDRATE 100 MG/ML
INJECTION, SOLUTION INTRAVENOUS CONTINUOUS PRN
Status: DISCONTINUED | OUTPATIENT
Start: 2024-06-19 | End: 2024-06-24 | Stop reason: HOSPADM

## 2024-06-19 RX ORDER — ACETAMINOPHEN 650 MG/1
650 SUPPOSITORY RECTAL EVERY 6 HOURS PRN
Status: DISCONTINUED | OUTPATIENT
Start: 2024-06-19 | End: 2024-06-24 | Stop reason: HOSPADM

## 2024-06-19 RX ORDER — ATORVASTATIN CALCIUM 80 MG/1
80 TABLET, FILM COATED ORAL NIGHTLY
Status: DISCONTINUED | OUTPATIENT
Start: 2024-06-19 | End: 2024-06-20

## 2024-06-19 RX ORDER — GABAPENTIN 100 MG/1
100 CAPSULE ORAL 3 TIMES DAILY
Status: DISCONTINUED | OUTPATIENT
Start: 2024-06-19 | End: 2024-06-24 | Stop reason: HOSPADM

## 2024-06-19 RX ORDER — SODIUM CHLORIDE 0.9 % (FLUSH) 0.9 %
5-40 SYRINGE (ML) INJECTION EVERY 12 HOURS SCHEDULED
Status: DISCONTINUED | OUTPATIENT
Start: 2024-06-19 | End: 2024-06-24 | Stop reason: HOSPADM

## 2024-06-19 RX ORDER — FUROSEMIDE 40 MG/1
40 TABLET ORAL DAILY
COMMUNITY

## 2024-06-19 RX ORDER — ONDANSETRON 2 MG/ML
4 INJECTION INTRAMUSCULAR; INTRAVENOUS EVERY 6 HOURS PRN
Status: DISCONTINUED | OUTPATIENT
Start: 2024-06-19 | End: 2024-06-24 | Stop reason: HOSPADM

## 2024-06-19 RX ORDER — MIDODRINE HYDROCHLORIDE 5 MG/1
5 TABLET ORAL
Status: DISCONTINUED | OUTPATIENT
Start: 2024-06-19 | End: 2024-06-24 | Stop reason: HOSPADM

## 2024-06-19 RX ORDER — METRONIDAZOLE 500 MG/100ML
500 INJECTION, SOLUTION INTRAVENOUS ONCE
Status: COMPLETED | OUTPATIENT
Start: 2024-06-19 | End: 2024-06-20

## 2024-06-19 RX ORDER — FUROSEMIDE 10 MG/ML
20 INJECTION INTRAMUSCULAR; INTRAVENOUS ONCE
Status: COMPLETED | OUTPATIENT
Start: 2024-06-19 | End: 2024-06-19

## 2024-06-19 RX ORDER — ACETAMINOPHEN 325 MG/1
650 TABLET ORAL EVERY 6 HOURS PRN
Status: DISCONTINUED | OUTPATIENT
Start: 2024-06-19 | End: 2024-06-24 | Stop reason: HOSPADM

## 2024-06-19 RX ORDER — TRAZODONE HYDROCHLORIDE 100 MG/1
100 TABLET ORAL NIGHTLY PRN
Status: DISCONTINUED | OUTPATIENT
Start: 2024-06-19 | End: 2024-06-24 | Stop reason: HOSPADM

## 2024-06-19 RX ORDER — SODIUM CHLORIDE 0.9 % (FLUSH) 0.9 %
5-40 SYRINGE (ML) INJECTION PRN
Status: DISCONTINUED | OUTPATIENT
Start: 2024-06-19 | End: 2024-06-24 | Stop reason: HOSPADM

## 2024-06-19 RX ORDER — METOPROLOL SUCCINATE 25 MG/1
25 TABLET, EXTENDED RELEASE ORAL DAILY
Status: DISCONTINUED | OUTPATIENT
Start: 2024-06-20 | End: 2024-06-24 | Stop reason: HOSPADM

## 2024-06-19 RX ORDER — INSULIN LISPRO 100 [IU]/ML
0-4 INJECTION, SOLUTION INTRAVENOUS; SUBCUTANEOUS NIGHTLY
Status: DISCONTINUED | OUTPATIENT
Start: 2024-06-19 | End: 2024-06-24 | Stop reason: HOSPADM

## 2024-06-19 RX ORDER — FAMOTIDINE 20 MG/1
10 TABLET, FILM COATED ORAL 2 TIMES DAILY
Status: DISCONTINUED | OUTPATIENT
Start: 2024-06-19 | End: 2024-06-24 | Stop reason: HOSPADM

## 2024-06-19 RX ORDER — INSULIN GLARGINE 100 [IU]/ML
10 INJECTION, SOLUTION SUBCUTANEOUS NIGHTLY
Status: DISCONTINUED | OUTPATIENT
Start: 2024-06-19 | End: 2024-06-24 | Stop reason: HOSPADM

## 2024-06-19 RX ORDER — 0.9 % SODIUM CHLORIDE 0.9 %
1000 INTRAVENOUS SOLUTION INTRAVENOUS ONCE
Status: COMPLETED | OUTPATIENT
Start: 2024-06-19 | End: 2024-06-19

## 2024-06-19 RX ORDER — ONDANSETRON 4 MG/1
4 TABLET, ORALLY DISINTEGRATING ORAL EVERY 8 HOURS PRN
Status: DISCONTINUED | OUTPATIENT
Start: 2024-06-19 | End: 2024-06-24 | Stop reason: HOSPADM

## 2024-06-19 RX ORDER — ALBUTEROL SULFATE 90 UG/1
2 AEROSOL, METERED RESPIRATORY (INHALATION) EVERY 6 HOURS PRN
COMMUNITY

## 2024-06-19 RX ORDER — LEVOFLOXACIN 5 MG/ML
750 INJECTION, SOLUTION INTRAVENOUS
Status: DISCONTINUED | OUTPATIENT
Start: 2024-06-21 | End: 2024-06-20

## 2024-06-19 RX ORDER — GABAPENTIN 100 MG/1
100 CAPSULE ORAL 3 TIMES DAILY
COMMUNITY

## 2024-06-19 RX ORDER — DIGOXIN 125 MCG
125 TABLET ORAL DAILY
COMMUNITY

## 2024-06-19 RX ORDER — SODIUM CHLORIDE 9 MG/ML
INJECTION, SOLUTION INTRAVENOUS PRN
Status: DISCONTINUED | OUTPATIENT
Start: 2024-06-19 | End: 2024-06-24 | Stop reason: HOSPADM

## 2024-06-19 RX ORDER — CAPSAICIN 0.025 %
CREAM (GRAM) TOPICAL 3 TIMES DAILY
COMMUNITY

## 2024-06-19 RX ORDER — DILTIAZEM HYDROCHLORIDE 5 MG/ML
5 INJECTION INTRAVENOUS ONCE
Status: COMPLETED | OUTPATIENT
Start: 2024-06-19 | End: 2024-06-19

## 2024-06-19 RX ORDER — METRONIDAZOLE 500 MG/100ML
500 INJECTION, SOLUTION INTRAVENOUS EVERY 8 HOURS
Status: DISCONTINUED | OUTPATIENT
Start: 2024-06-20 | End: 2024-06-20

## 2024-06-19 RX ORDER — ATORVASTATIN CALCIUM 80 MG/1
80 TABLET, FILM COATED ORAL NIGHTLY
COMMUNITY

## 2024-06-19 RX ADMIN — IOPAMIDOL 75 ML: 755 INJECTION, SOLUTION INTRAVENOUS at 18:10

## 2024-06-19 RX ADMIN — INSULIN GLARGINE 10 UNITS: 100 INJECTION, SOLUTION SUBCUTANEOUS at 23:55

## 2024-06-19 RX ADMIN — FUROSEMIDE 20 MG: 10 INJECTION, SOLUTION INTRAMUSCULAR; INTRAVENOUS at 20:46

## 2024-06-19 RX ADMIN — GABAPENTIN 100 MG: 100 CAPSULE ORAL at 23:56

## 2024-06-19 RX ADMIN — SODIUM CHLORIDE 500 ML: 9 INJECTION, SOLUTION INTRAVENOUS at 23:55

## 2024-06-19 RX ADMIN — SODIUM CHLORIDE 1000 ML: 9 INJECTION, SOLUTION INTRAVENOUS at 16:59

## 2024-06-19 RX ADMIN — FAMOTIDINE 10 MG: 20 TABLET, FILM COATED ORAL at 23:56

## 2024-06-19 RX ADMIN — DILTIAZEM HYDROCHLORIDE 2.5 MG/HR: 5 INJECTION, SOLUTION INTRAVENOUS at 16:48

## 2024-06-19 RX ADMIN — ATORVASTATIN CALCIUM 80 MG: 80 TABLET, FILM COATED ORAL at 23:56

## 2024-06-19 RX ADMIN — LEVOFLOXACIN 750 MG: 5 INJECTION, SOLUTION INTRAVENOUS at 21:10

## 2024-06-19 RX ADMIN — METRONIDAZOLE 500 MG: 500 INJECTION, SOLUTION INTRAVENOUS at 23:51

## 2024-06-19 RX ADMIN — DILTIAZEM HYDROCHLORIDE 5 MG: 5 INJECTION, SOLUTION INTRAVENOUS at 16:43

## 2024-06-19 RX ADMIN — TRAZODONE HYDROCHLORIDE 100 MG: 100 TABLET ORAL at 23:56

## 2024-06-19 RX ADMIN — MIDODRINE HYDROCHLORIDE 5 MG: 5 TABLET ORAL at 23:56

## 2024-06-19 ASSESSMENT — PAIN SCALES - GENERAL: PAINLEVEL_OUTOF10: 0

## 2024-06-19 ASSESSMENT — LIFESTYLE VARIABLES
HOW MANY STANDARD DRINKS CONTAINING ALCOHOL DO YOU HAVE ON A TYPICAL DAY: PATIENT DOES NOT DRINK
HOW OFTEN DO YOU HAVE A DRINK CONTAINING ALCOHOL: NEVER

## 2024-06-19 ASSESSMENT — PAIN - FUNCTIONAL ASSESSMENT: PAIN_FUNCTIONAL_ASSESSMENT: NONE - DENIES PAIN

## 2024-06-19 NOTE — ED PROVIDER NOTES
[facility] [place] EMERGENCY DEPARTMENT    EMERGENCY DEPARTMENT ENCOUNTER        Patient Name: Gera Lamb  MRN: [id number]  Birthdate 1955  Date of evaluation: 6/19/2024  PCP: Romaine Canada Jr., MD  Note Started: 4:59 PM EDT 6/19/24    CHIEF COMPLAINT       Chest Pain (FOR ABOUT A WEEK, BUT NOW FEELS MORE WEAK AND HAVING TROUBLE AMBULATING )      HISTORY OF PRESENT ILLNESS: 1 or more Elements       Gera Lamb is a 68 y.o. female who presents to the emergency department for evaluation of chest pain.  Patient reports having history of atrial fibrillation and is on anticoagulation.  Reports that she was having high heart rate over the past few days.  Her home health nurse was concerned yesterday and came back in today to evaluate her and urged her to come into the ER to be evaluated.  They noticed that her heart rate was up to 140s.  Said she tried to take her medications as prescribed, but she has not been feeling well and has been throwing up.  She thinks she threw up some of her medications.    Says she has had some family members who have been sick with COVID-19.  Has been coughing for about a week.  Decreased p.o. intake.    No other complaints, modifying factors or associated symptoms.     History obtained by the patient unless stated otherwise as above on HPI.   No limitations unless specified as above on HPI.     Past medical history:   Past Medical History:   Diagnosis Date    CHF (congestive heart failure) (HCC)     DM2 (diabetes mellitus, type 2) (HCC)     Paroxysmal atrial fibrillation (HCC)        Past surgical history:   Past Surgical History:   Procedure Laterality Date    CORONARY ARTERY BYPASS GRAFT         Home medications:   Prior to Admission medications    Medication Sig Start Date End Date Taking? Authorizing Provider   rivaroxaban (XARELTO) 20 MG TABS tablet Take 1 tablet by mouth daily (with breakfast)   Yes Provider, MD Darshan   atorvastatin (LIPITOR) 80 MG  along the sigmoid colon which is unchanged.      Atrophic uterus with mild free fluid in the pelvis which is more prominent   with no abscess.  There is no pelvic mass or active inflammation and a normal   appendix      Chronic liver changes with fatty replacement throughout.      Probable bilateral adrenal hyperplasia which is more apparent.      Mild-to-moderate bibasilar pleural effusions and associated moderate   atelectasis and consolidations posteriorly which is more prominent.         XR CHEST PORTABLE   Final Result   1. New moderate right pleural effusion.   2. Left mid lung zone and left lung base infiltrates.   3. Cardiomegaly.           XR CHEST PORTABLE    Result Date: 6/19/2024  EXAMINATION: ONE XRAY VIEW OF THE CHEST 6/19/2024 4:06 pm COMPARISON: None. HISTORY: ORDERING SYSTEM PROVIDED HISTORY: palpitations TECHNOLOGIST PROVIDED HISTORY: Reason for exam:->palpitations Reason for Exam: palpitations FINDINGS: There is new moderate right pleural effusion.  There is some infiltrate noted in the left mid lung zone and left lung base.  There is cardiomegaly. Patient is status post sternotomy.  Loop recorder overlies the heart.  Bony structures are unremarkable.     1. New moderate right pleural effusion. 2. Left mid lung zone and left lung base infiltrates. 3. Cardiomegaly.         Bedside Ultrasound, as interpreted by me, if performed:    No results found.    PROCEDURES     Unless otherwise noted below, none     Procedures    CRITICAL CARE TIME     I personally spent a total of 35 minutes of critical care time in obtaining history, performing a physical exam, bedside monitoring of interventions, collecting and interpreting tests and discussion with consultants but excluding time spent performing procedures, treating other patients and teaching time.                                                                                                         EMERGENCY DEPARTMENT COURSE and DIFFERENTIAL

## 2024-06-19 NOTE — PROGRESS NOTES
Pt placed on 2L NC d/t satting only 88% on RA. Also updated Dr Castellanos on BNP results. Order received to stop NS bolus early due to elevated BNP. Pt received 800ml instead of the full 1000ml that was originally ordered

## 2024-06-19 NOTE — ED TRIAGE NOTES
ARRIVED VIA SQUAD FOR CHEST PAIN FOR ABOUT A WEEK, BUT NOW FEELS MORE WEAK AND HAVING TROUBLE AMBULATING

## 2024-06-19 NOTE — PROGRESS NOTES
Medication Reconciliation    List of medications patient is currently taking is not complete.     Source of information: 1. Conversation with patient at bedside                                      2. EPIC records      Allergies  Lisinopril and Metformin     Notes regarding home medications:   1. Patient has not received any of her home medications prior to arrival to the emergency department.    2. Patient was a poor historian and could not confirm most of her medications. The ones she has confirmed, she states she has not taken in the past week since she has felt sick.     3. Patient's list was updated via dispense report along with [facility] records. Patient states she is on Lantus, but could not confirm units. She can also patient supply Trulicity if needed.     4. Patient was switched from Eliquis to Xarelto 20 mg tablets, last taken earlier this week.     Rena Perez, Pharmacy Intern  6/19/2024 7:39 PM

## 2024-06-20 ENCOUNTER — HOSPITAL ENCOUNTER (OUTPATIENT)
Age: 69
Discharge: HOME | End: 2024-06-20
Attending: INTERNAL MEDICINE
Payer: COMMERCIAL

## 2024-06-20 ENCOUNTER — APPOINTMENT (OUTPATIENT)
Dept: ULTRASOUND IMAGING | Age: 69
DRG: 308 | End: 2024-06-20
Payer: COMMERCIAL

## 2024-06-20 PROBLEM — K82.1 HYDROPS OF GALLBLADDER: Status: ACTIVE | Noted: 2024-06-20

## 2024-06-20 LAB
ANION GAP SERPL CALCULATED.3IONS-SCNC: 13 MMOL/L (ref 3–16)
BASOPHILS # BLD: 0 K/UL (ref 0–0.2)
BASOPHILS NFR BLD: 0.6 %
BUN SERPL-MCNC: 22 MG/DL (ref 7–20)
CALCIUM SERPL-MCNC: 8.8 MG/DL (ref 8.3–10.6)
CHLORIDE SERPL-SCNC: 106 MMOL/L (ref 99–110)
CO2 SERPL-SCNC: 22 MMOL/L (ref 21–32)
CREAT SERPL-MCNC: 1.1 MG/DL (ref 0.6–1.2)
DEPRECATED RDW RBC AUTO: 15.6 % (ref 12.4–15.4)
ECHO BSA: 1.64 M2
EKG DIAGNOSIS: NORMAL
EKG DIAGNOSIS: NORMAL
EKG Q-T INTERVAL: 404 MS
EKG Q-T INTERVAL: 444 MS
EKG QRS DURATION: 86 MS
EKG QRS DURATION: 94 MS
EKG QTC CALCULATION (BAZETT): 472 MS
EKG QTC CALCULATION (BAZETT): 585 MS
EKG R AXIS: 25 DEGREES
EKG R AXIS: 53 DEGREES
EKG T AXIS: 190 DEGREES
EKG T AXIS: 96 DEGREES
EKG VENTRICULAR RATE: 126 BPM
EKG VENTRICULAR RATE: 68 BPM
EOSINOPHIL # BLD: 0.1 K/UL (ref 0–0.6)
EOSINOPHIL NFR BLD: 1.3 %
GFR SERPLBLD CREATININE-BSD FMLA CKD-EPI: 54 ML/MIN/{1.73_M2}
GLUCOSE BLD-MCNC: 121 MG/DL (ref 70–99)
GLUCOSE BLD-MCNC: 137 MG/DL (ref 70–99)
GLUCOSE BLD-MCNC: 207 MG/DL (ref 70–99)
GLUCOSE SERPL-MCNC: 88 MG/DL (ref 70–99)
HCT VFR BLD AUTO: 38.6 % (ref 36–48)
HGB BLD-MCNC: 12.6 G/DL (ref 12–16)
LACTATE BLDV-SCNC: 1.6 MMOL/L (ref 0.4–2)
LYMPHOCYTES # BLD: 1.9 K/UL (ref 1–5.1)
LYMPHOCYTES NFR BLD: 28 %
MCH RBC QN AUTO: 29.4 PG (ref 26–34)
MCHC RBC AUTO-ENTMCNC: 32.7 G/DL (ref 31–36)
MCV RBC AUTO: 90.1 FL (ref 80–100)
MONOCYTES # BLD: 0.7 K/UL (ref 0–1.3)
MONOCYTES NFR BLD: 10.7 %
NEUTROPHILS # BLD: 4 K/UL (ref 1.7–7.7)
NEUTROPHILS NFR BLD: 59.4 %
PERFORMED ON: ABNORMAL
PLATELET # BLD AUTO: 141 K/UL (ref 135–450)
PMV BLD AUTO: 9.3 FL (ref 5–10.5)
POTASSIUM SERPL-SCNC: 3.7 MMOL/L (ref 3.5–5.1)
RBC # BLD AUTO: 4.29 M/UL (ref 4–5.2)
SODIUM SERPL-SCNC: 141 MMOL/L (ref 136–145)
WBC # BLD AUTO: 6.7 K/UL (ref 4–11)

## 2024-06-20 PROCEDURE — 85025 COMPLETE CBC W/AUTO DIFF WBC: CPT

## 2024-06-20 PROCEDURE — 99223 1ST HOSP IP/OBS HIGH 75: CPT | Performed by: INTERNAL MEDICINE

## 2024-06-20 PROCEDURE — 93010 ELECTROCARDIOGRAM REPORT: CPT | Performed by: INTERNAL MEDICINE

## 2024-06-20 PROCEDURE — 36415 COLL VENOUS BLD VENIPUNCTURE: CPT

## 2024-06-20 PROCEDURE — 5A2204Z RESTORATION OF CARDIAC RHYTHM, SINGLE: ICD-10-PCS | Performed by: INTERNAL MEDICINE

## 2024-06-20 PROCEDURE — 93005 ELECTROCARDIOGRAM TRACING: CPT | Performed by: INTERNAL MEDICINE

## 2024-06-20 PROCEDURE — 76705 ECHO EXAM OF ABDOMEN: CPT

## 2024-06-20 PROCEDURE — 80048 BASIC METABOLIC PNL TOTAL CA: CPT

## 2024-06-20 PROCEDURE — 2580000003 HC RX 258: Performed by: INTERNAL MEDICINE

## 2024-06-20 PROCEDURE — APPSS15 APP SPLIT SHARED TIME 0-15 MINUTES: Performed by: PHYSICIAN ASSISTANT

## 2024-06-20 PROCEDURE — 99254 IP/OBS CNSLTJ NEW/EST MOD 60: CPT | Performed by: STUDENT IN AN ORGANIZED HEALTH CARE EDUCATION/TRAINING PROGRAM

## 2024-06-20 PROCEDURE — 83605 ASSAY OF LACTIC ACID: CPT

## 2024-06-20 PROCEDURE — 92960 CARDIOVERSION ELECTRIC EXT: CPT

## 2024-06-20 PROCEDURE — 2500000003 HC RX 250 WO HCPCS

## 2024-06-20 PROCEDURE — 92960 CARDIOVERSION ELECTRIC EXT: CPT | Performed by: INTERNAL MEDICINE

## 2024-06-20 PROCEDURE — 2580000003 HC RX 258

## 2024-06-20 PROCEDURE — 6360000002 HC RX W HCPCS: Performed by: INTERNAL MEDICINE

## 2024-06-20 PROCEDURE — 6370000000 HC RX 637 (ALT 250 FOR IP): Performed by: INTERNAL MEDICINE

## 2024-06-20 PROCEDURE — 2700000000 HC OXYGEN THERAPY PER DAY

## 2024-06-20 PROCEDURE — 2500000003 HC RX 250 WO HCPCS: Performed by: INTERNAL MEDICINE

## 2024-06-20 PROCEDURE — 94761 N-INVAS EAR/PLS OXIMETRY MLT: CPT

## 2024-06-20 PROCEDURE — 2060000000 HC ICU INTERMEDIATE R&B

## 2024-06-20 PROCEDURE — 99152 MOD SED SAME PHYS/QHP 5/>YRS: CPT | Performed by: INTERNAL MEDICINE

## 2024-06-20 PROCEDURE — APPNB15 APP NON BILLABLE TIME 0-15 MINS: Performed by: PHYSICIAN ASSISTANT

## 2024-06-20 RX ORDER — FUROSEMIDE 10 MG/ML
40 INJECTION INTRAMUSCULAR; INTRAVENOUS 2 TIMES DAILY
Status: DISCONTINUED | OUTPATIENT
Start: 2024-06-20 | End: 2024-06-24 | Stop reason: HOSPADM

## 2024-06-20 RX ORDER — AMIODARONE HYDROCHLORIDE 200 MG/1
200 TABLET ORAL 2 TIMES DAILY
Status: DISCONTINUED | OUTPATIENT
Start: 2024-06-20 | End: 2024-06-24 | Stop reason: HOSPADM

## 2024-06-20 RX ORDER — ATORVASTATIN CALCIUM 40 MG/1
40 TABLET, FILM COATED ORAL NIGHTLY
Status: DISCONTINUED | OUTPATIENT
Start: 2024-06-20 | End: 2024-06-21

## 2024-06-20 RX ORDER — CLOPIDOGREL BISULFATE 75 MG/1
75 TABLET ORAL DAILY
COMMUNITY

## 2024-06-20 RX ORDER — CLOPIDOGREL BISULFATE 75 MG/1
75 TABLET ORAL DAILY
Status: DISCONTINUED | OUTPATIENT
Start: 2024-06-20 | End: 2024-06-24 | Stop reason: HOSPADM

## 2024-06-20 RX ADMIN — TRAZODONE HYDROCHLORIDE 100 MG: 100 TABLET ORAL at 20:32

## 2024-06-20 RX ADMIN — MIDODRINE HYDROCHLORIDE 5 MG: 5 TABLET ORAL at 12:15

## 2024-06-20 RX ADMIN — FUROSEMIDE 40 MG: 10 INJECTION, SOLUTION INTRAMUSCULAR; INTRAVENOUS at 09:49

## 2024-06-20 RX ADMIN — EMPAGLIFLOZIN 10 MG: 10 TABLET, FILM COATED ORAL at 09:46

## 2024-06-20 RX ADMIN — PIPERACILLIN AND TAZOBACTAM 3375 MG: 3; .375 INJECTION, POWDER, LYOPHILIZED, FOR SOLUTION INTRAVENOUS at 20:39

## 2024-06-20 RX ADMIN — AMIODARONE HYDROCHLORIDE 200 MG: 200 TABLET ORAL at 20:32

## 2024-06-20 RX ADMIN — PIPERACILLIN AND TAZOBACTAM 3375 MG: 3; .375 INJECTION, POWDER, LYOPHILIZED, FOR SOLUTION INTRAVENOUS at 14:59

## 2024-06-20 RX ADMIN — DIGOXIN 125 MCG: 125 TABLET ORAL at 09:47

## 2024-06-20 RX ADMIN — MIDODRINE HYDROCHLORIDE 5 MG: 5 TABLET ORAL at 17:29

## 2024-06-20 RX ADMIN — CLOPIDOGREL BISULFATE 75 MG: 75 TABLET ORAL at 09:46

## 2024-06-20 RX ADMIN — SODIUM CHLORIDE: 9 INJECTION, SOLUTION INTRAVENOUS at 14:58

## 2024-06-20 RX ADMIN — ATORVASTATIN CALCIUM 40 MG: 40 TABLET, FILM COATED ORAL at 20:32

## 2024-06-20 RX ADMIN — GABAPENTIN 100 MG: 100 CAPSULE ORAL at 14:59

## 2024-06-20 RX ADMIN — MIDODRINE HYDROCHLORIDE 5 MG: 5 TABLET ORAL at 09:47

## 2024-06-20 RX ADMIN — DULOXETINE HYDROCHLORIDE 20 MG: 20 CAPSULE, DELAYED RELEASE ORAL at 09:46

## 2024-06-20 RX ADMIN — INSULIN LISPRO 1 UNITS: 100 INJECTION, SOLUTION INTRAVENOUS; SUBCUTANEOUS at 18:19

## 2024-06-20 RX ADMIN — AMIODARONE HYDROCHLORIDE 200 MG: 200 TABLET ORAL at 09:47

## 2024-06-20 RX ADMIN — FAMOTIDINE 10 MG: 20 TABLET, FILM COATED ORAL at 09:49

## 2024-06-20 RX ADMIN — METHOHEXITAL SODIUM 30 MG: 500 INJECTION, POWDER, LYOPHILIZED, FOR SOLUTION INTRAMUSCULAR; INTRAVENOUS; RECTAL at 12:51

## 2024-06-20 RX ADMIN — METOPROLOL SUCCINATE 25 MG: 25 TABLET, EXTENDED RELEASE ORAL at 09:46

## 2024-06-20 RX ADMIN — FAMOTIDINE 10 MG: 20 TABLET, FILM COATED ORAL at 20:31

## 2024-06-20 RX ADMIN — Medication 10 ML: at 09:50

## 2024-06-20 RX ADMIN — Medication 10 ML: at 20:35

## 2024-06-20 RX ADMIN — RIVAROXABAN 20 MG: 20 TABLET, FILM COATED ORAL at 09:46

## 2024-06-20 RX ADMIN — GABAPENTIN 100 MG: 100 CAPSULE ORAL at 09:46

## 2024-06-20 RX ADMIN — GABAPENTIN 100 MG: 100 CAPSULE ORAL at 20:32

## 2024-06-20 RX ADMIN — FUROSEMIDE 40 MG: 10 INJECTION, SOLUTION INTRAMUSCULAR; INTRAVENOUS at 17:29

## 2024-06-20 ASSESSMENT — PAIN SCALES - GENERAL: PAINLEVEL_OUTOF10: 0

## 2024-06-20 NOTE — PROGRESS NOTES
V2.0  [facility] Daily Progress Note      Name:  Gera Lamb /Age/Sex: 1955  (68 y.o. female)   MRN & CSN:  [id number] & [id number] Encounter Date/Time: 2024 1:30 PM EDT    Location:  [id number]/5255-01 PCP: Romaine Canada Jr., MD       Hospital Day: 2    Assessment and Plan:   Gera Lamb is a 68 y.o. female with medical history significant for chronic A-fib, CAD s/p CABG, tobacco dependency, type 2 diabetes mellitus, chronic systolic heart failure, insomnia, peripheral neuropathy who was admitted with shortness of breath and A-fib with RVR.    Assessment/Plan :   1.  A-fib with RVR.  Currently on Cardizem drip and cardiology are planning for cardioversion this afternoon.  Continue Xarelto, digoxin and metoprolol.    2.  Acute on chronic systolic heart failure.  Previous EF 2030% on TTE in 2024.  Markedly elevated proBNP.  Will increase IV Lasix to 40 mg twice a day.  Monitor intake output and daily body weight.    3.  Acute respiratory failure with hypoxia due to pulmonary edema and possible pneumonia.  Wean off oxygen as tolerated.    4.  Possible colitis and mild gallbladder wall thickening.  These findings could be due to anasarca as she has no diarrhea or abdominal pain.  Her QT interval is prolonged and will switch from Levaquin to Zosyn.    5.  Pneumonia.  Follow-up pneumonia panel and procalcitonin.  Continue Zosyn.    6.  History of CAD s/p CABG.  Recent admission at  and left heart cath and underwent PCI on the de nasima stenosis in the proximal third of the saphenous vein graft to the proximal right posterior descending.  Cardiology evaluation is pending.    7.  Type 2 diabetes mellitus.  Continue basal and bolus insulin and keep on hypoglycemia protocol.    8.  Tobacco dependency.  Recommended complete cessation    DVT prophylaxis:Lovenox     Disposition: 2 to 3 days.    Personally reviewed Lab Studies and Imaging      Medical Decision Making:  The following items were

## 2024-06-20 NOTE — CARE COORDINATION
Case Management Assessment  Initial Evaluation    Date/Time of Evaluation: 6/20/2024 4:07 PM  Assessment Completed by: Jenny Suero RN    If patient is discharged prior to next notation, then this note serves as note for discharge by case management.    Patient Name: Gera Lamb                   YOB: 1955  Diagnosis: Hydrops of gallbladder [K82.1]  Colitis [K52.9]  Hypoxia [R09.02]  Atrial fibrillation with RVR (HCC) [I48.91]  Elevated brain natriuretic peptide (BNP) level [R79.89]  Ground glass opacity present on imaging of lung [R91.8]  Nausea and vomiting, unspecified vomiting type [R11.2]                   Date / Time: 6/19/2024  3:33 PM    Patient Admission Status: Inpatient   Readmission Risk (Low < 19, Mod (19-27), High > 27): Readmission Risk Score: 9.7    Current PCP: No primary care provider on file.  PCP verified by ? Yes (goes to [facility] for primary care)    Chart Reviewed: Yes      History Provided by: Patient  Patient Orientation: Alert and Oriented    Patient Cognition: Alert    Hospitalization in the last 30 days (Readmission):  No    If yes, Readmission Assessment in CM Navigator will be completed.    Advance Directives:      Code Status: Full Code   Patient's Primary Decision Maker is: Legal Next of Kin      Discharge Planning:    Patient lives with: Alone Type of Home: Apartment (2nd floor with elevator access)  Primary Care Giver: Self  Patient Support Systems include: Family Members   Current Financial resources: Medicaid  Current community resources: None  Current services prior to admission: Durable Medical Equipment, Home Care (active with [facility] for nursing, says she just got approved for aide for homemaking thru [employer])            Current DME: Walker, Cane, Shower Chair, Other (Comment) (grab bars)            Type of Home Care services:  Nursing Services, Aide Services    ADLS  Prior functional level: Assistance with the following:,

## 2024-06-20 NOTE — FLOWSHEET NOTE
Report called to Rachel CORTEZ. Patient has voided x2 on bedpan. Transferred to room 5255 per stretcher per So[facility] transporter.

## 2024-06-20 NOTE — CONSULTS
[facility]  Cardiology Consult Note        CC:      Shortness of breath             HPI:   This is a 68 y.o. female with a history of cardiomyopathy EF of 25% coronary artery bypass surgery about 25 years ago and recent stent to the SVG to the PDA and history of A-fib on Xarelto presents with shortness of not feeling well.  He also mentioned about having heart rate.  Several days.  Unable to keep things that her medicines will not work..  A home health nurse.  140 recommended the patient go to the ER     In the emergency room she was found to be tachycardic, normotensive with oxygenation above 90% on room air.  proBNP came 21,819.  CT scan was negative for PE.  Abdominal shows colitis and cholelithiasis.  She has been started on IV Cardizem for rate control.  The patient has been seen by surgery for colitis and the cholelithiasis and recommend treating her with antibiotics.  She is on Levaquin and metronidazole.    Atorvastatin 80, Clopidogrel 75, Digoxin, Jardiance, Furosemide 40, metoprolol 25, Midodrine, xarelto 20    Past Medical History:   Diagnosis Date    CHF (congestive heart failure) ([facility])     DM2 (diabetes mellitus, type 2) ([facility])     Paroxysmal atrial fibrillation ([facility])       Past Surgical History:   Procedure Laterality Date    CORONARY ARTERY BYPASS GRAFT        Family History   Problem Relation Age of Onset    Asthma Mother     Cancer Mother     Heart Failure Mother     Hypertension Father     Diabetes Father     Heart Disease Father     Hypertension Brother     High Cholesterol Brother     Heart Disease Brother     Diabetes Paternal Grandmother       Social History     Tobacco Use    Smoking status: Some Days     Types: Cigarettes    Smokeless tobacco: Never   Substance Use Topics    Alcohol use: Yes     Comment: occasional    Drug use: Never      Allergies   Allergen Reactions    Lisinopril Cough    Metformin Nausea And Vomiting     Abdominal pain along with nausea and vomiting       clopidogrel  75 mg Oral Daily    sodium chloride flush  5-40 mL IntraVENous 2 times per day    atorvastatin  80 mg Oral Nightly    digoxin  125 mcg Oral Daily    DULoxetine  20 mg Oral Daily    empagliflozin  10 mg Oral Daily    famotidine  10 mg Oral BID    gabapentin  100 mg Oral TID    insulin glargine  10 Units SubCUTAneous Nightly    metoprolol succinate  25 mg Oral Daily    midodrine  5 mg Oral TID WC    rivaroxaban  20 mg Oral Daily with breakfast    insulin lispro  0-4 Units SubCUTAneous TID WC    insulin lispro  0-4 Units SubCUTAneous Nightly    furosemide  20 mg IntraVENous BID    metroNIDAZOLE  500 mg IntraVENous Q8H    [START ON 6/21/2024] levofloxacin  750 mg IntraVENous Q48H       Review of Systems -   Constitutional: Negative for weight gain/loss; malaise, fever  Respiratory: Negative for Asthma;  cough and hemoptysis  Cardiovascular: Negative for palpitations,dizziness   Gastrointestinal: Negative for abd.pain; constipation/diarrhea;    Genitourinary: Negative for stones; hematuria; frequency hesitancy  Integumentt: Negative for rash or pruritis  Hematologic/lymphatic: Negative for blood dyscrasia; leukemia/lymphoma  Musculoskeletal: Negative for Connective tissue disease  Neurological:  Negative for Seizure   Behavioral/Psych:Negative for Bipolar disorder, Schizophrenia; Dementia  Endocrine: negative for thyroid, parathyroid disease      Intake/Output Summary (Last 24 hours) at 6/20/2024 0805  Last data filed at 6/20/2024 0555  Gross per 24 hour   Intake 120 ml   Output 1100 ml   Net -980 ml       Physical Examination:    BP (!) 82/56   Pulse 89   Temp 97.7 °F (36.5 °C)   Resp 18   Ht 1.575 m (5' 2\")   Wt 60 kg (132 lb 4.4 oz)   SpO2 92%   BMI 24.19 kg/m²    HEENT:  Face: Atraumatic, Conjunctiva: Pink; non icteric,  Mucous Memb:  Moist, No thyromegaly or Lymphadenopathy  Respiratory:  Resp Assessment: normal, Resp Auscultation: clear   Cardiovascular:  Auscultation: nl S1 & S2, Palpation:  Nl

## 2024-06-20 NOTE — CONSULTS
Surgery Consult Note     KAUR Estrella,-C  Pt Name: Gera Lamb  MRN: [id number]  YOB: 1955  Date of evaluation: 6/20/2024  Primary Care Physician: Romaine Canada Jr., MD  Referred By: Chivo Toney   Reason for Consultation: cholelithiasis, gallbladder wall thickening vs edema, hydrops  Chief Complaint:SOB, nausea and emesis  IMPRESSIONS:   A.fib with RVR  On Xarelto  On Plavix  CHF. Pro-BNP 21,819  Acute respiratory failure  Hx CABG  CT scan showed mild colitis and cholelithiasis which is unchanged and mild hydrops of the gallbladder with questionable mild gallbladder wall thickening  WBC count WNL  LFT's WNL  PLANS:   No general surgery plans at this time. Treat colitis and possible gallbladder disease with antibiotics.   If surgery was needed would need to hold her Xarelto and Plavix  Continue current therapy  Will continue to monitor and give further recommendations as needed.   SUBJECTIVE:   History of Chief Complaint:    Gera Lamb is a 68 y.o. female who presented with nausea, emesis and SOB. She was found to have A. Fib with RVR. She is currently on plavix and Xarelto. We have been asked to evaluate her because her CT scan showed cholelithiasis which is unchanged and mild hydrops of the gallbladder with  questionable mild gallbladder wall thickening or wall edema which is more  Apparent, and mild colitis involving the ascending and proximal transverse colon with slowly resolving colitis involving the left colon with no bowel obstruction. She had an ultrasound done this AM and that showed  Mild increased echogenicity of the liver which is likely on the basis of   changes of fatty infiltration as described above. Also findings consistent with presence of sludge and gallstones within the gallbladder, and thickening of the gallbladder wall with suggestion of minimal mural/pericholecystic fluid.  She admits to having intermittent RUQ abdominal pain. Her last BM was 5 days  and a normal  appendix     Chronic liver changes with fatty replacement throughout.     Probable bilateral adrenal hyperplasia which is more apparent.     Mild-to-moderate bibasilar pleural effusions and associated moderate  atelectasis and consolidations posteriorly which is more prominent.      US (6/20/24):   1. Mild increased echogenicity of the liver.  Finding likely on the basis of   changes of fatty infiltration as described above.   2. Findings consistent with presence of sludge and gallstones within the   gallbladder.   3. Thickening of the gallbladder wall with suggestion of minimal   mural/pericholecystic fluid.  Findings can be seen with changes of   cholecystitis, acute and or chronic.       Thank you for the interesting evaluation. Further recommendations to follow.    Aj Taylor PA-C  General and Vascular Surgery [phone]  Electronically signed by Aj Morales PA-C on 6/20/2024 at 8:59 AM

## 2024-06-20 NOTE — DISCHARGE INSTRUCTIONS
Extra Heart Failure Education/ Tools/ Resources:     https://[employer].com/publication/?q=088712   --- this is American Heart Association interactive Healthier Living with Heart Failure guidebook.  Please click hyperlink or copy / paste link into search bar. The QR Code is also available below. Use your mouse to scroll through the pages.  Lots of information about weight monitoring, diet tips, activity, meds, etc    Heart Failure Tools and Resources QR Code is below. It includes multiple resources to include symptom tracker, med tracker, further HF info, and access to a HF Support Network online Community    HF Roberts Danisha  -- this is a free smart phone danisha available for iPhone and Android download.  Use your phone to track sodium / fluid intake, zone tool symptom tracking, weights, medications, etc. Click on this hyperlink  HF Roberts Danisha   for QR code for easy download or the link is also found in the below HF Tools and Resources.      DASH (Dietary Approach to Stop Hypertension) diet --  https://www.nhlbi.nih.gov/education/dash-eating-plan -- this diet is a flexible eating plan that promotes heart healthy eating style.  Click on hyperlink or copy / paste link into search bar.  Lots of low sodium recipes and tips.    [email or web address]://[email or web address]/recipes  -- more free recipes

## 2024-06-20 NOTE — H&P
History and Physical      Name:  Gera Lamb /Age/Sex: 1955  (68 y.o. female)   MRN & CSN:  [id number] & [id number] Encounter Date/Time: 2024 8:10 PM EDT   Location:  [facility] PCP: Romaine Canada Jr., MD       Hospital Day: 1    Assessment and Plan:     #.  A-fib with RVR  -HR on arrival 146  -Patient received Cardizem 5 mg IV, started on Cardizem drip.  -Continue Cardizem drip  -Patient is on metoprolol succinate, digoxin, Xarelto-resume  -Consult cardiology    #.  Acute on chronic systolic CHF  -Reviewed Care Everywhere-2D echo-3/2024-EF 25-30%  -CTA chest-mild to moderate bibasilar pleural effusions  -proBNP 99602  -Rapid COVID negative.  Respiratory viral panel ordered.  -Patient is on Lasix 40 mg daily-could not take it due to nausea, vomiting.  -Continue IV Lasix 20 mg IV twice daily  -Strict input/output, daily weight    #.  Acute respiratory failure with hypoxia-secondary to above-patient was saturating 77% on room air.  Currently on 2 L of oxygen saturating 92%.  -VBG-pH 7.34, pCO2 37.5, pO2<30, HCO3 21.  -CTA chest-no PE  -Bilateral pleural effusions, pneumonia    #.  Nausea, vomiting  -CT abdomen/pelvis-cholelithiasis with is unchanged with mild hydrops of the gallbladder with questionable mild gallbladder wall thickening or wall edema.  -Could be secondary to fluid overload  -Continue levofloxacin, Flagyl  -Ultrasound abdomen ordered  -Consult general surgery    #.  Colitis as per CT abdomen/pelvis  -Continue antibiotics-levofloxacin, Flagyl    #. ?  Pneumonia  -CTA chest-bibasilar atelectasis and consolidations posteriorly extending anteriorly which is more apparent.  -Continue levofloxacin    #.  Recent admission to [facility]--2024 for A-fib with RVR systolic CHF  -Patient underwent LHC/RHC-2024-patient had PCI on the de nasima stenosis in the proximal third of the saphenous vein graft to the proximal right posterior descending.  -Patient was advised to continue  the radiation dose to as low as reasonably achievable. COMPARISON: 01/27/2024 HISTORY: ORDERING SYSTEM PROVIDED HISTORY: abdominal pain. n/v/d TECHNOLOGIST PROVIDED HISTORY: Reason for exam:->abdominal pain. n/v/d Additional Contrast?->None Decision Support Exception - unselect if not a suspected or confirmed emergency medical condition->Emergency Medical Condition (MA) Reason for Exam: abdominal pain. n/v/d FINDINGS: Lower Chest:   There are mild-to-moderate bibasilar pleural effusions with moderate atelectasis and consolidation along the lung bases posteriorly extending anteriorly.  The visualized pulmonary arteries along the lung bases are well opacified with no obvious filling defect and no pulmonary nodule or mass is seen Organs:   The liver is mildly enlarged with hypertrophy of the caudate and left lobes and fatty replacement throughout.  The gallbladder is mildly dilated which is more prominent with tiny gallstones posteriorly and extending along the fundus which is unchanged.  There is a questionable mild wall thickening or edema anterior to the liver which is more apparent.  There is no surrounding inflammation.  The bile ducts and pancreas are normal.  The spleen is unremarkable.  There is diffuse mild thickening of both adrenal glands which is more prominent.  The kidneys are normal size and function normally with no hydronephrosis, renal stone, or mass.  The ureters are normal caliber. GI/Bowel:   The appendix is normal with no pericecal inflammation.  There is mild circumferential mucosal thickening along the ascending and proximal transverse colon which is more apparent.  There is mild circumferential mucosal thickening along the distal left colon and sigmoid region which is less prominent.  There are diverticula throughout the left colon.  The small bowel is normal caliber.  The mesentery is unremarkable. Pelvis:   The bladder is unremarkable.  The uterus is atrophic.  There is mild free fluid in the

## 2024-06-20 NOTE — PROGRESS NOTES
4 Eyes Skin Assessment     NAME:  Gera Lamb  YOB: 1955  MEDICAL RECORD NUMBER:  [id number]    The patient is being assessed for  Admission    I agree that at least one RN has performed a thorough Head to Toe Skin Assessment on the patient. ALL assessment sites listed below have been assessed.      Areas assessed by both nurses:    Head, Face, Ears, Shoulders, Back, Chest, Arms, Elbows, Hands, Sacrum. Buttock, Coccyx, Ischium, Legs. Feet and Heels, and Under Medical Devices         Does the Patient have a Wound? No noted wound(s)       Florentin Prevention initiated by RN: No  Wound Care Orders initiated by RN: No    Pressure Injury (Stage 3,4, Unstageable, DTI, NWPT, and Complex wounds) if present, place Wound referral order by RN under : No    New Ostomies, if present place, Ostomy referral order under : No     Nurse 1 eSignature: Electronically signed by Sean Ghotra RN on 6/20/24 at 12:27 AM EDT    **SHARE this note so that the co-signing nurse can place an eSignature**    Nurse 2 eSignature: {Esignature:[id number]}

## 2024-06-20 NOTE — PLAN OF CARE
Problem: Discharge Planning  Goal: Discharge to home or other facility with appropriate resources  6/20/2024 1339 by Rachel Miner RN  Outcome: Progressing  Flowsheets (Taken 6/20/2024 0940)  Discharge to home or other facility with appropriate resources:   Identify barriers to discharge with patient and caregiver   Arrange for needed discharge resources and transportation as appropriate   Identify discharge learning needs (meds, wound care, etc)  6/20/2024 0020 by Paradise Estrada RN  Outcome: Progressing     Problem: Safety - Adult  Goal: Free from fall injury  6/20/2024 1339 by Rachel Miner RN  Outcome: Progressing  Flowsheets (Taken 6/20/2024 1339)  Free From Fall Injury: Instruct family/caregiver on patient safety  6/20/2024 0020 by Paradise Estrada RN  Outcome: Progressing     Problem: Respiratory - Adult  Goal: Achieves optimal ventilation and oxygenation  Outcome: Progressing  Flowsheets (Taken 6/20/2024 1339)  Achieves optimal ventilation and oxygenation:   Assess for changes in respiratory status   Assess for changes in mentation and behavior   Position to facilitate oxygenation and minimize respiratory effort   Oxygen supplementation based on oxygen saturation or arterial blood gases     Problem: Cardiovascular - Adult  Goal: Maintains optimal cardiac output and hemodynamic stability  Outcome: Progressing  Flowsheets (Taken 6/20/2024 1339)  Maintains optimal cardiac output and hemodynamic stability: Monitor blood pressure and heart rate  Goal: Absence of cardiac dysrhythmias or at baseline  Outcome: Progressing  Flowsheets (Taken 6/20/2024 1339)  Absence of cardiac dysrhythmias or at baseline:   Monitor cardiac rate and rhythm   Assess for signs of decreased cardiac output     Problem: Musculoskeletal - Adult  Goal: Return mobility to safest level of function  Outcome: Progressing  Flowsheets (Taken 6/20/2024 1339)  Return Mobility to Safest Level of Function:   Assess patient stability and activity

## 2024-06-20 NOTE — PROGRESS NOTES
Pt admitted to room 5255 at approximately 2145 on 6/19. Pt is A&Ox4, able to make her needs known. Pt states she lives in an apartment alone in a senior living community. She has a cane, walker and wheelchair at home. Pt denies chest pain at this time. She does have SOB on exacerbation- still requiring 2L NC- RA baseline. VSS. Afib on tele.   Sean Ghotra RN

## 2024-06-20 NOTE — FLOWSHEET NOTE
Patient is awake and taking sips of coke and given snack. Patient remains in sinus rhythm to sinus arrhythmia.

## 2024-06-20 NOTE — PROGRESS NOTES
Patient returned from Cardioversion this afternoon. VSS. Sinus arrhythmia/sinus rhythm with PACs. Assessment unchanged. Patient denies pain.    Patient frequently stands and pivots to bedside commode and tolerates this very well. Patient calls for assistance appropriately.    Patient is currently lying comfortably in bed watching television. Bed in lowest position, call button within reach. Care ongoing.

## 2024-06-21 PROBLEM — K59.01 SLOW TRANSIT CONSTIPATION: Status: ACTIVE | Noted: 2024-06-21

## 2024-06-21 PROBLEM — Z79.01 ANTICOAGULATED: Status: ACTIVE | Noted: 2024-06-21

## 2024-06-21 LAB
EKG ATRIAL RATE: 101 BPM
EKG DIAGNOSIS: NORMAL
EKG Q-T INTERVAL: 446 MS
EKG QRS DURATION: 90 MS
EKG QTC CALCULATION (BAZETT): 508 MS
EKG R AXIS: 17 DEGREES
EKG T AXIS: -14 DEGREES
EKG VENTRICULAR RATE: 78 BPM
GLUCOSE BLD-MCNC: 145 MG/DL (ref 70–99)
GLUCOSE BLD-MCNC: 152 MG/DL (ref 70–99)
GLUCOSE BLD-MCNC: 171 MG/DL (ref 70–99)
GLUCOSE BLD-MCNC: 94 MG/DL (ref 70–99)
NT-PROBNP SERPL-MCNC: 6041 PG/ML (ref 0–124)
PERFORMED ON: ABNORMAL
PERFORMED ON: NORMAL

## 2024-06-21 PROCEDURE — 6370000000 HC RX 637 (ALT 250 FOR IP): Performed by: INTERNAL MEDICINE

## 2024-06-21 PROCEDURE — 6360000002 HC RX W HCPCS: Performed by: INTERNAL MEDICINE

## 2024-06-21 PROCEDURE — 99233 SBSQ HOSP IP/OBS HIGH 50: CPT | Performed by: INTERNAL MEDICINE

## 2024-06-21 PROCEDURE — 6370000000 HC RX 637 (ALT 250 FOR IP): Performed by: SURGERY

## 2024-06-21 PROCEDURE — 94761 N-INVAS EAR/PLS OXIMETRY MLT: CPT

## 2024-06-21 PROCEDURE — 83880 ASSAY OF NATRIURETIC PEPTIDE: CPT

## 2024-06-21 PROCEDURE — 99232 SBSQ HOSP IP/OBS MODERATE 35: CPT | Performed by: SURGERY

## 2024-06-21 PROCEDURE — 2580000003 HC RX 258: Performed by: INTERNAL MEDICINE

## 2024-06-21 PROCEDURE — 36415 COLL VENOUS BLD VENIPUNCTURE: CPT

## 2024-06-21 PROCEDURE — APPSS15 APP SPLIT SHARED TIME 0-15 MINUTES: Performed by: PHYSICIAN ASSISTANT

## 2024-06-21 PROCEDURE — 2060000000 HC ICU INTERMEDIATE R&B

## 2024-06-21 PROCEDURE — APPNB15 APP NON BILLABLE TIME 0-15 MINS: Performed by: PHYSICIAN ASSISTANT

## 2024-06-21 PROCEDURE — 93010 ELECTROCARDIOGRAM REPORT: CPT | Performed by: INTERNAL MEDICINE

## 2024-06-21 PROCEDURE — 2700000000 HC OXYGEN THERAPY PER DAY

## 2024-06-21 RX ORDER — ATORVASTATIN CALCIUM 20 MG/1
20 TABLET, FILM COATED ORAL NIGHTLY
Status: DISCONTINUED | OUTPATIENT
Start: 2024-06-21 | End: 2024-06-24 | Stop reason: HOSPADM

## 2024-06-21 RX ORDER — POLYETHYLENE GLYCOL 3350 17 G/17G
17 POWDER, FOR SOLUTION ORAL DAILY
Status: DISCONTINUED | OUTPATIENT
Start: 2024-06-21 | End: 2024-06-22

## 2024-06-21 RX ORDER — DOCUSATE SODIUM 100 MG/1
100 CAPSULE, LIQUID FILLED ORAL 2 TIMES DAILY
Status: DISCONTINUED | OUTPATIENT
Start: 2024-06-21 | End: 2024-06-24 | Stop reason: HOSPADM

## 2024-06-21 RX ADMIN — DIGOXIN 125 MCG: 125 TABLET ORAL at 09:09

## 2024-06-21 RX ADMIN — MIDODRINE HYDROCHLORIDE 5 MG: 5 TABLET ORAL at 09:09

## 2024-06-21 RX ADMIN — Medication 10 ML: at 20:28

## 2024-06-21 RX ADMIN — MIDODRINE HYDROCHLORIDE 5 MG: 5 TABLET ORAL at 17:51

## 2024-06-21 RX ADMIN — GABAPENTIN 100 MG: 100 CAPSULE ORAL at 09:10

## 2024-06-21 RX ADMIN — PIPERACILLIN AND TAZOBACTAM 3375 MG: 3; .375 INJECTION, POWDER, LYOPHILIZED, FOR SOLUTION INTRAVENOUS at 03:39

## 2024-06-21 RX ADMIN — Medication 10 ML: at 09:11

## 2024-06-21 RX ADMIN — FAMOTIDINE 10 MG: 20 TABLET, FILM COATED ORAL at 20:24

## 2024-06-21 RX ADMIN — MIDODRINE HYDROCHLORIDE 5 MG: 5 TABLET ORAL at 11:40

## 2024-06-21 RX ADMIN — FUROSEMIDE 40 MG: 10 INJECTION, SOLUTION INTRAMUSCULAR; INTRAVENOUS at 17:51

## 2024-06-21 RX ADMIN — PIPERACILLIN AND TAZOBACTAM 3375 MG: 3; .375 INJECTION, POWDER, LYOPHILIZED, FOR SOLUTION INTRAVENOUS at 20:26

## 2024-06-21 RX ADMIN — AMIODARONE HYDROCHLORIDE 200 MG: 200 TABLET ORAL at 20:24

## 2024-06-21 RX ADMIN — GABAPENTIN 100 MG: 100 CAPSULE ORAL at 13:57

## 2024-06-21 RX ADMIN — RIVAROXABAN 20 MG: 20 TABLET, FILM COATED ORAL at 09:10

## 2024-06-21 RX ADMIN — FAMOTIDINE 10 MG: 20 TABLET, FILM COATED ORAL at 09:10

## 2024-06-21 RX ADMIN — INSULIN GLARGINE 10 UNITS: 100 INJECTION, SOLUTION SUBCUTANEOUS at 20:30

## 2024-06-21 RX ADMIN — GABAPENTIN 100 MG: 100 CAPSULE ORAL at 20:24

## 2024-06-21 RX ADMIN — EMPAGLIFLOZIN 10 MG: 10 TABLET, FILM COATED ORAL at 09:10

## 2024-06-21 RX ADMIN — DULOXETINE HYDROCHLORIDE 20 MG: 20 CAPSULE, DELAYED RELEASE ORAL at 09:09

## 2024-06-21 RX ADMIN — PIPERACILLIN AND TAZOBACTAM 3375 MG: 3; .375 INJECTION, POWDER, LYOPHILIZED, FOR SOLUTION INTRAVENOUS at 11:40

## 2024-06-21 RX ADMIN — DOCUSATE SODIUM 100 MG: 100 CAPSULE, LIQUID FILLED ORAL at 20:24

## 2024-06-21 RX ADMIN — CLOPIDOGREL BISULFATE 75 MG: 75 TABLET ORAL at 09:11

## 2024-06-21 RX ADMIN — POLYETHYLENE GLYCOL 3350 17 G: 17 POWDER, FOR SOLUTION ORAL at 09:26

## 2024-06-21 RX ADMIN — ATORVASTATIN CALCIUM 20 MG: 20 TABLET, FILM COATED ORAL at 20:24

## 2024-06-21 RX ADMIN — POLYETHYLENE GLYCOL 3350 17 G: 17 POWDER, FOR SOLUTION ORAL at 17:50

## 2024-06-21 RX ADMIN — AMIODARONE HYDROCHLORIDE 200 MG: 200 TABLET ORAL at 09:10

## 2024-06-21 NOTE — PLAN OF CARE
Problem: Discharge Planning  Goal: Discharge to home or other facility with appropriate resources  6/21/2024 1024 by Nadine Zepeda RN  Outcome: Progressing  Flowsheets (Taken 6/21/2024 0920)  Discharge to home or other facility with appropriate resources: Identify barriers to discharge with patient and caregiver     Problem: Safety - Adult  Goal: Free from fall injury  6/21/2024 1024 by Nadine Zepeda RN  Outcome: Progressing     Problem: Respiratory - Adult  Goal: Achieves optimal ventilation and oxygenation  6/21/2024 1024 by Nadine Zepeda RN  Outcome: Progressing  Flowsheets (Taken 6/21/2024 0920)  Achieves optimal ventilation and oxygenation: Assess for changes in respiratory status     Problem: Cardiovascular - Adult  Goal: Maintains optimal cardiac output and hemodynamic stability  6/21/2024 1024 by Nadine Zepeda RN  Outcome: Progressing  Flowsheets (Taken 6/21/2024 0920)  Maintains optimal cardiac output and hemodynamic stability: Monitor blood pressure and heart rate     Problem: Cardiovascular - Adult  Goal: Absence of cardiac dysrhythmias or at baseline  6/21/2024 1024 by Nadine Zepeda RN  Outcome: Progressing  Flowsheets (Taken 6/21/2024 0920)  Absence of cardiac dysrhythmias or at baseline: Monitor cardiac rate and rhythm     Problem: Musculoskeletal - Adult  Goal: Return mobility to safest level of function  6/21/2024 1024 by Nadine Zepeda RN  Outcome: Progressing  Flowsheets (Taken 6/21/2024 0920)  Return Mobility to Safest Level of Function: Assess patient stability and activity tolerance for standing, transferring and ambulating with or without assistive devices     Problem: Musculoskeletal - Adult  Goal: Maintain proper alignment of affected body part  6/21/2024 1024 by Nadine Zepeda RN  Outcome: Progressing  Flowsheets (Taken 6/21/2024 0920)  Maintain proper alignment of affected body part: Support and protect limb and body alignment per

## 2024-06-21 NOTE — PLAN OF CARE
Problem: Discharge Planning  Goal: Discharge to home or other facility with appropriate resources  Outcome: Progressing     Problem: Safety - Adult  Goal: Free from fall injury  Outcome: Progressing     Problem: Respiratory - Adult  Goal: Achieves optimal ventilation and oxygenation  Outcome: Progressing     Problem: Cardiovascular - Adult  Goal: Maintains optimal cardiac output and hemodynamic stability  Outcome: Progressing     Problem: Cardiovascular - Adult  Goal: Absence of cardiac dysrhythmias or at baseline  Outcome: Progressing     Problem: Musculoskeletal - Adult  Goal: Return mobility to safest level of function  Outcome: Progressing     Problem: Musculoskeletal - Adult  Goal: Return mobility to safest level of function  Outcome: Progressing     Problem: Musculoskeletal - Adult  Goal: Maintain proper alignment of affected body part  Outcome: Progressing     Problem: Musculoskeletal - Adult  Goal: Return ADL status to a safe level of function  Outcome: Progressing     Problem: Metabolic/Fluid and Electrolytes - Adult  Goal: Electrolytes maintained within normal limits  Outcome: Progressing     Problem: Metabolic/Fluid and Electrolytes - Adult  Goal: Hemodynamic stability and optimal renal function maintained  Outcome: Progressing     Problem: Metabolic/Fluid and Electrolytes - Adult  Goal: Glucose maintained within prescribed range  Outcome: Progressing     Problem: Hematologic - Adult  Goal: Maintains hematologic stability  Outcome: Progressing     Problem: Neurosensory - Adult  Goal: Achieves stable or improved neurological status  Outcome: Progressing     Problem: Skin/Tissue Integrity - Adult  Goal: Skin integrity remains intact  Outcome: Progressing     Problem: Skin/Tissue Integrity - Adult  Goal: Oral mucous membranes remain intact  Outcome: Progressing     Problem: Gastrointestinal - Adult  Goal: Minimal or absence of nausea and vomiting  Outcome: Progressing     Problem: Genitourinary -

## 2024-06-21 NOTE — PROGRESS NOTES
[facility]  Cardiology Consult Note        CC:      Shortness of breath             HPI:   This is a 68 y.o. female with a history of cardiomyopathy EF of 25% coronary artery bypass surgery about 25 years ago and recent stent to the SVG to the PDA and history of A-fib on Xarelto presents with shortness of not feeling well.  He also mentioned about having heart rate.  Several days.  Unable to keep things that her medicines will not work..  A home health nurse.  140 recommended the patient go to the ER     In the emergency room she was found to be tachycardic, normotensive with oxygenation above 90% on room air.  proBNP came 21,819.  CT scan was negative for PE.  Abdominal shows colitis and cholelithiasis.  She has been started on IV Cardizem for rate control.  The patient has been seen by surgery for colitis and the cholelithiasis and recommend treating her with antibiotics.  She is on Levaquin and metronidazole.    Atorvastatin 80, Clopidogrel 75, Digoxin, Jardiance, Furosemide 40, metoprolol 25, Midodrine, xarelto 20    Interval history  Status post cardioversion yesterday that was successful  On Lasix twice daily  Good diuresis    Past Medical History:   Diagnosis Date    CHF (congestive heart failure) ([facility])     DM2 (diabetes mellitus, type 2) ([facility])     Paroxysmal atrial fibrillation ([facility])       Past Surgical History:   Procedure Laterality Date    CORONARY ARTERY BYPASS GRAFT        Family History   Problem Relation Age of Onset    Asthma Mother     Cancer Mother     Heart Failure Mother     Hypertension Father     Diabetes Father     Heart Disease Father     Hypertension Brother     High Cholesterol Brother     Heart Disease Brother     Diabetes Paternal Grandmother       Social History     Tobacco Use    Smoking status: Some Days     Types: Cigarettes    Smokeless tobacco: Never   Substance Use Topics    Alcohol use: Yes     Comment: occasional    Drug use: Never      Allergies   Allergen Reactions

## 2024-06-21 NOTE — PROGRESS NOTES
described on the CT study of 06/19/2024.  No focal abnormality of the visualized portion of the liver is identified.  No evidence of intrahepatic biliary ductal dilatation. BILIARY SYSTEM:  Echogenic material identified about the gallbladder suggestive of presence of sludge.  More focal echogenic foci about the gallbladder related to presence of gallstones.  Gallstones were suspected on the CT study 06/19/2024.  There is thickening of the gallbladder wall measuring 0.85 cm in maximum thickness.  There is minimal mural edema/pericholecystic fluid.  Negative ultrasonographic Fong's sign.Common bile duct is within normal limits measuring 0.43 cm in AP diameter in the region the jignesh hepatis. RIGHT KIDNEY: The right kidney is grossly unremarkable without evidence of hydronephrosis. Right kidney measures 9.2 cm in length. PANCREAS:  Visualized portions of the pancreas are unremarkable.     1. Mild increased echogenicity of the liver.  Finding likely on the basis of changes of fatty infiltration as described above. 2. Findings consistent with presence of sludge and gallstones within the gallbladder. 3. Thickening of the gallbladder wall with suggestion of minimal mural/pericholecystic fluid.  Findings can be seen with changes of cholecystitis, acute and or chronic.     CT ABDOMEN PELVIS W IV CONTRAST Additional Contrast? None    Result Date: 6/19/2024  EXAMINATION: CT OF THE ABDOMEN AND PELVIS WITH CONTRAST 6/19/2024 6:04 pm TECHNIQUE: CT of the abdomen and pelvis was performed with the administration of intravenous contrast. Multiplanar reformatted images are provided for review. Automated exposure control, iterative reconstruction, and/or weight based adjustment of the mA/kV was utilized to reduce the radiation dose to as low as reasonably achievable. COMPARISON: 01/27/2024 HISTORY: ORDERING SYSTEM PROVIDED HISTORY: abdominal pain. n/v/d TECHNOLOGIST PROVIDED HISTORY: Reason for exam:->abdominal pain. n/v/d  is seen. Bones/Soft Tissues:   The bones are intact.  No aggressive osseous lesion is seen.     Cholelithiasis which is unchanged and mild hydrops of the gallbladder with questionable mild gallbladder wall thickening or wall edema which is more apparent.  Suggest ultrasound follow-up. Mild colitis involving the ascending and proximal transverse colon with slowly resolving colitis involving the left colon with no bowel obstruction. Extensive diverticulosis along the sigmoid colon which is unchanged. Atrophic uterus with mild free fluid in the pelvis which is more prominent with no abscess.  There is no pelvic mass or active inflammation and a normal appendix Chronic liver changes with fatty replacement throughout. Probable bilateral adrenal hyperplasia which is more apparent. Mild-to-moderate bibasilar pleural effusions and associated moderate atelectasis and consolidations posteriorly which is more prominent.     CT CHEST PULMONARY EMBOLISM W CONTRAST    Result Date: 6/19/2024  EXAMINATION: CTA OF THE CHEST 6/19/2024 6:04 pm TECHNIQUE: CTA of the chest was performed after the administration of intravenous contrast.  Multiplanar reformatted images are provided for review.  MIP images are provided for review. Automated exposure control, iterative reconstruction, and/or weight based adjustment of the mA/kV was utilized to reduce the radiation dose to as low as reasonably achievable. COMPARISON: None HISTORY: ORDERING SYSTEM PROVIDED HISTORY: elevated d dimer. afib w rvr. TECHNOLOGIST PROVIDED HISTORY: Reason for exam:->elevated d dimer. afib w rvr. Additional Contrast?->1 Reason for Exam: elevated d dimer. afib w rvr. FINDINGS: Pulmonary Arteries: There is motion artifact throughout the exam.  The central pulmonary arteries are well opacified with no filling defects in the upper lobes.  The subsegmental pulmonary arteries to the right lower lobe posteriorly are not well opacified and there is moderate consolidation and

## 2024-06-21 NOTE — PROGRESS NOTES
Spoke with MD Clark with [facility] regarding patient's BP. OK to HOLD lasix, OK to give digoxin and amiodarone per MD Clark.     18 bts of PAT.    Electronically signed by Nadine Zepeda RN on 6/21/2024 at 10:20 AM

## 2024-06-21 NOTE — PROGRESS NOTES
General and Vascular Surgery                                                           Daily Progress Note                                                             Aj Morales PA-C    Pt Name: Gera Lamb  Medical Record Number: [id number]  Date of Birth 1955   Today's Date: 6/21/2024    ASSESSMENT/PLAN  A.fib with RVR  On Xarelto  On Plavix  CHF.   Acute respiratory failure  Hx CABG  CT scan showed mild colitis and cholelithiasis which is unchanged and mild hydrops of the gallbladder with questionable mild gallbladder wall thickening  WBC count WNL  LFT's WNL  Denies any abdominal pain today. Feeling better.  No general surgery plans at this time. Treat colitis and possible gallbladder disease with antibiotics.   If surgery is needed would need to hold her Xarelto and Plavix  Continue current therapy  Will continue to monitor and give further recommendations as needed.     EDUCATION  Patient educated about their illness/diagnosis, stated above, and all questions answered. We discussed the importance of nutrition, medications they are taking, and healthy lifestyle.  SUBJECTIVE  Gera has improved from yesterday. Pain is well controlled. She has no nausea and no vomiting.  She has passed flatus and has had a bowel movement. She is tolerating regular diet. Current activity is ad jose    OBJECTIVE  VITALS:  height is 1.575 m (5' 2\") and weight is 58.9 kg (129 lb 13.6 oz). Her oral temperature is 97.7 °F (36.5 °C). Her blood pressure is 104/69 and her pulse is 69. Her respiration is 18 and oxygen saturation is 92%. VITALS:  /69   Pulse 69   Temp 97.7 °F (36.5 °C) (Oral)   Resp 18   Ht 1.575 m (5' 2\")   Wt 58.9 kg (129 lb 13.6 oz)   SpO2 92%   BMI 23.75 kg/m²   GENERAL: alert, no distress  LUNGS: clear to ausculation, without wheezes, rales or rhonci  HEART: normal rate and regular rhythm  ABDOMEN: soft, non-tender,  eating.  + flatus, no BM    NAD, alert and oriented  Normal respiratory effort, no accessory muscle use  Abd soft, ND, minimal epigastric tenderness, no peritonitis  Ext no cyanosis or clubbing    WBC 6.7  Cr 1.1    A/P: 67 yo female with afib, CHF, CAD on eliquis and plavix, with cholecystitis and colitis    Continue IV antibiotics  Continue regular diet  Will add bowel regimen for constipation  No plans for surgery as an inpatient.  Hopeful discharge soon.  Will have patient follow up with Dr. Torres as an outpatient    Jovi Leach MD

## 2024-06-22 LAB
ALBUMIN SERPL-MCNC: 3.6 G/DL (ref 3.4–5)
ALBUMIN/GLOB SERPL: 1.1 {RATIO} (ref 1.1–2.2)
ALP SERPL-CCNC: 97 U/L (ref 40–129)
ALT SERPL-CCNC: <5 U/L (ref 10–40)
ANION GAP SERPL CALCULATED.3IONS-SCNC: 13 MMOL/L (ref 3–16)
AST SERPL-CCNC: 16 U/L (ref 15–37)
BILIRUB SERPL-MCNC: 0.6 MG/DL (ref 0–1)
BUN SERPL-MCNC: 13 MG/DL (ref 7–20)
CALCIUM SERPL-MCNC: 8.8 MG/DL (ref 8.3–10.6)
CHLORIDE SERPL-SCNC: 97 MMOL/L (ref 99–110)
CO2 SERPL-SCNC: 26 MMOL/L (ref 21–32)
CREAT SERPL-MCNC: 1.4 MG/DL (ref 0.6–1.2)
GFR SERPLBLD CREATININE-BSD FMLA CKD-EPI: 41 ML/MIN/{1.73_M2}
GLUCOSE BLD-MCNC: 107 MG/DL (ref 70–99)
GLUCOSE BLD-MCNC: 130 MG/DL (ref 70–99)
GLUCOSE BLD-MCNC: 151 MG/DL (ref 70–99)
GLUCOSE BLD-MCNC: 157 MG/DL (ref 70–99)
GLUCOSE SERPL-MCNC: 148 MG/DL (ref 70–99)
MAGNESIUM SERPL-MCNC: 1.5 MG/DL (ref 1.8–2.4)
NT-PROBNP SERPL-MCNC: 5220 PG/ML (ref 0–124)
PERFORMED ON: ABNORMAL
POTASSIUM SERPL-SCNC: 2.9 MMOL/L (ref 3.5–5.1)
PROT SERPL-MCNC: 6.8 G/DL (ref 6.4–8.2)
SODIUM SERPL-SCNC: 136 MMOL/L (ref 136–145)

## 2024-06-22 PROCEDURE — 6370000000 HC RX 637 (ALT 250 FOR IP): Performed by: INTERNAL MEDICINE

## 2024-06-22 PROCEDURE — 99233 SBSQ HOSP IP/OBS HIGH 50: CPT | Performed by: INTERNAL MEDICINE

## 2024-06-22 PROCEDURE — 83880 ASSAY OF NATRIURETIC PEPTIDE: CPT

## 2024-06-22 PROCEDURE — 6370000000 HC RX 637 (ALT 250 FOR IP): Performed by: SURGERY

## 2024-06-22 PROCEDURE — 99232 SBSQ HOSP IP/OBS MODERATE 35: CPT | Performed by: SURGERY

## 2024-06-22 PROCEDURE — 2580000003 HC RX 258: Performed by: INTERNAL MEDICINE

## 2024-06-22 PROCEDURE — 2060000000 HC ICU INTERMEDIATE R&B

## 2024-06-22 PROCEDURE — 2700000000 HC OXYGEN THERAPY PER DAY

## 2024-06-22 PROCEDURE — 36415 COLL VENOUS BLD VENIPUNCTURE: CPT

## 2024-06-22 PROCEDURE — 80053 COMPREHEN METABOLIC PANEL: CPT

## 2024-06-22 PROCEDURE — 83735 ASSAY OF MAGNESIUM: CPT

## 2024-06-22 PROCEDURE — 94761 N-INVAS EAR/PLS OXIMETRY MLT: CPT

## 2024-06-22 PROCEDURE — 6360000002 HC RX W HCPCS: Performed by: INTERNAL MEDICINE

## 2024-06-22 RX ORDER — POLYETHYLENE GLYCOL 3350 17 G/17G
17 POWDER, FOR SOLUTION ORAL 2 TIMES DAILY
Status: DISCONTINUED | OUTPATIENT
Start: 2024-06-22 | End: 2024-06-24 | Stop reason: HOSPADM

## 2024-06-22 RX ADMIN — AMIODARONE HYDROCHLORIDE 200 MG: 200 TABLET ORAL at 08:51

## 2024-06-22 RX ADMIN — PIPERACILLIN AND TAZOBACTAM 3375 MG: 3; .375 INJECTION, POWDER, LYOPHILIZED, FOR SOLUTION INTRAVENOUS at 04:45

## 2024-06-22 RX ADMIN — POLYETHYLENE GLYCOL 3350 17 G: 17 POWDER, FOR SOLUTION ORAL at 08:50

## 2024-06-22 RX ADMIN — PIPERACILLIN AND TAZOBACTAM 3375 MG: 3; .375 INJECTION, POWDER, LYOPHILIZED, FOR SOLUTION INTRAVENOUS at 13:22

## 2024-06-22 RX ADMIN — FAMOTIDINE 10 MG: 20 TABLET, FILM COATED ORAL at 08:50

## 2024-06-22 RX ADMIN — AMIODARONE HYDROCHLORIDE 200 MG: 200 TABLET ORAL at 21:05

## 2024-06-22 RX ADMIN — Medication 10 ML: at 08:55

## 2024-06-22 RX ADMIN — GABAPENTIN 100 MG: 100 CAPSULE ORAL at 13:22

## 2024-06-22 RX ADMIN — ATORVASTATIN CALCIUM 20 MG: 20 TABLET, FILM COATED ORAL at 21:05

## 2024-06-22 RX ADMIN — Medication 10 ML: at 21:05

## 2024-06-22 RX ADMIN — DOCUSATE SODIUM 100 MG: 100 CAPSULE, LIQUID FILLED ORAL at 21:03

## 2024-06-22 RX ADMIN — EMPAGLIFLOZIN 10 MG: 10 TABLET, FILM COATED ORAL at 08:51

## 2024-06-22 RX ADMIN — FAMOTIDINE 10 MG: 20 TABLET, FILM COATED ORAL at 21:03

## 2024-06-22 RX ADMIN — METOPROLOL SUCCINATE 25 MG: 25 TABLET, EXTENDED RELEASE ORAL at 08:51

## 2024-06-22 RX ADMIN — FUROSEMIDE 40 MG: 10 INJECTION, SOLUTION INTRAMUSCULAR; INTRAVENOUS at 08:50

## 2024-06-22 RX ADMIN — DULOXETINE HYDROCHLORIDE 20 MG: 20 CAPSULE, DELAYED RELEASE ORAL at 08:51

## 2024-06-22 RX ADMIN — DIGOXIN 125 MCG: 125 TABLET ORAL at 08:51

## 2024-06-22 RX ADMIN — PIPERACILLIN AND TAZOBACTAM 3375 MG: 3; .375 INJECTION, POWDER, LYOPHILIZED, FOR SOLUTION INTRAVENOUS at 21:16

## 2024-06-22 RX ADMIN — GABAPENTIN 100 MG: 100 CAPSULE ORAL at 21:03

## 2024-06-22 RX ADMIN — FUROSEMIDE 40 MG: 10 INJECTION, SOLUTION INTRAMUSCULAR; INTRAVENOUS at 18:28

## 2024-06-22 RX ADMIN — PSYLLIUM HUSK 1 PACKET: 3.4 POWDER ORAL at 13:21

## 2024-06-22 RX ADMIN — GABAPENTIN 100 MG: 100 CAPSULE ORAL at 08:51

## 2024-06-22 RX ADMIN — RIVAROXABAN 20 MG: 20 TABLET, FILM COATED ORAL at 08:51

## 2024-06-22 RX ADMIN — CLOPIDOGREL BISULFATE 75 MG: 75 TABLET ORAL at 08:51

## 2024-06-22 RX ADMIN — PSYLLIUM HUSK 1 PACKET: 3.4 POWDER ORAL at 21:05

## 2024-06-22 RX ADMIN — POLYETHYLENE GLYCOL 3350 17 G: 17 POWDER, FOR SOLUTION ORAL at 21:03

## 2024-06-22 RX ADMIN — MIDODRINE HYDROCHLORIDE 5 MG: 5 TABLET ORAL at 16:52

## 2024-06-22 RX ADMIN — MIDODRINE HYDROCHLORIDE 5 MG: 5 TABLET ORAL at 13:22

## 2024-06-22 RX ADMIN — DOCUSATE SODIUM 100 MG: 100 CAPSULE, LIQUID FILLED ORAL at 08:51

## 2024-06-22 RX ADMIN — MIDODRINE HYDROCHLORIDE 5 MG: 5 TABLET ORAL at 08:50

## 2024-06-22 RX ADMIN — INSULIN GLARGINE 10 UNITS: 100 INJECTION, SOLUTION SUBCUTANEOUS at 21:03

## 2024-06-22 ASSESSMENT — PAIN SCALES - GENERAL: PAINLEVEL_OUTOF10: 0

## 2024-06-22 NOTE — PLAN OF CARE
Problem: Discharge Planning  Goal: Discharge to home or other facility with appropriate resources  6/22/2024 1529 by Radha Quiñones RN  Outcome: Progressing  6/22/2024 0521 by Albania Martinez RN  Outcome: Progressing  Flowsheets (Taken 6/21/2024 2024)  Discharge to home or other facility with appropriate resources:   Identify barriers to discharge with patient and caregiver   Identify discharge learning needs (meds, wound care, etc)     Problem: Safety - Adult  Goal: Free from fall injury  6/22/2024 1529 by Radha Quiñones RN  Outcome: Progressing  6/22/2024 0521 by Albania Martinez RN  Outcome: Progressing  Flowsheets (Taken 6/22/2024 0521)  Free From Fall Injury: Instruct family/caregiver on patient safety     Problem: Respiratory - Adult  Goal: Achieves optimal ventilation and oxygenation  6/22/2024 1529 by Radha Quiñones RN  Outcome: Progressing  6/22/2024 0521 by Albania Martinez RN  Outcome: Progressing  Flowsheets (Taken 6/21/2024 2024)  Achieves optimal ventilation and oxygenation:   Assess for changes in respiratory status   Assess for changes in mentation and behavior   Position to facilitate oxygenation and minimize respiratory effort   Oxygen supplementation based on oxygen saturation or arterial blood gases     Problem: Cardiovascular - Adult  Goal: Maintains optimal cardiac output and hemodynamic stability  6/22/2024 1529 by Radha Quiñones RN  Outcome: Progressing  6/22/2024 0521 by Albania Martinez RN  Outcome: Progressing  Flowsheets (Taken 6/21/2024 2024)  Maintains optimal cardiac output and hemodynamic stability:   Monitor blood pressure and heart rate   Monitor urine output and notify Licensed Independent Practitioner for values outside of normal range   Assess for signs of decreased cardiac output  Goal: Absence of cardiac dysrhythmias or at baseline  6/22/2024 1529 by Radha Quiñones RN  Outcome: Progressing  6/22/2024 0521 by Michelle

## 2024-06-22 NOTE — PROGRESS NOTES
General and Vascular Surgery                                                           Daily Progress Note                                                             Jovi Leach MD    Pt Name: Grea Lamb  Medical Record Number: [id number]  Date of Birth 1955   Today's Date: 6/22/2024    ASSESSMENT/PLAN  A.fib with RVR, CHF, CAD with hx of CABG - continue xarelto, plavix.  Cardiology on board  CT scan showed mild colitis and cholelithiasis which is unchanged and mild hydrops of the gallbladder with questionable mild gallbladder wall thickening  No general surgery plans at this time. Treat colitis and possible gallbladder disease with antibiotics.   Will increase bowel regimen to facilitate BM.  Colace BID, miralax BID, metamucil BID.    EDUCATION  Patient educated about their illness/diagnosis, stated above, and all questions answered. We discussed the importance of nutrition, medications they are taking, and healthy lifestyle.    SUBJECTIVE  Gera has improved from yesterday. Pain is well controlled. She has minimal nausea but no vomiting.  She has passed flatus but has not had a BM. She is tolerating regular diet. Current activity is ad jose    OBJECTIVE  VITALS:  height is 1.575 m (5' 2\") and weight is 58.7 kg (129 lb 8 oz). Her oral temperature is 97.4 °F (36.3 °C). Her blood pressure is 113/77 and her pulse is 65. Her respiration is 14 and oxygen saturation is 96%. VITALS:  /77   Pulse 65   Temp 97.4 °F (36.3 °C) (Oral)   Resp 14   Ht 1.575 m (5' 2\")   Wt 58.7 kg (129 lb 8 oz)   SpO2 96%   BMI 23.69 kg/m²   GENERAL: alert, no distress  LUNGS: normal respiratory effort, no accessory muscle use  HEART: normal rate and regular rhythm  ABDOMEN: soft, non-tender, non-distended  EXTREMITY: no cyanosis, clubbing or edema  I/O last 3 completed shifts:  In: 770 [P.O.:720; IV Piggyback:50]  Out: 950 [Urine:950]  No intake/output

## 2024-06-22 NOTE — PLAN OF CARE
Problem: Discharge Planning  Goal: Discharge to home or other facility with appropriate resources  Outcome: Progressing  Flowsheets (Taken 6/21/2024 2024)  Discharge to home or other facility with appropriate resources:   Identify barriers to discharge with patient and caregiver   Identify discharge learning needs (meds, wound care, etc)     Problem: Safety - Adult  Goal: Free from fall injury  Outcome: Progressing  Flowsheets (Taken 6/22/2024 0521)  Free From Fall Injury: Instruct family/caregiver on patient safety     Problem: Respiratory - Adult  Goal: Achieves optimal ventilation and oxygenation  Outcome: Progressing  Flowsheets (Taken 6/21/2024 2024)  Achieves optimal ventilation and oxygenation:   Assess for changes in respiratory status   Assess for changes in mentation and behavior   Position to facilitate oxygenation and minimize respiratory effort   Oxygen supplementation based on oxygen saturation or arterial blood gases     Problem: Cardiovascular - Adult  Goal: Maintains optimal cardiac output and hemodynamic stability  Outcome: Progressing  Flowsheets (Taken 6/21/2024 2024)  Maintains optimal cardiac output and hemodynamic stability:   Monitor blood pressure and heart rate   Monitor urine output and notify Licensed Independent Practitioner for values outside of normal range   Assess for signs of decreased cardiac output  Goal: Absence of cardiac dysrhythmias or at baseline  Outcome: Progressing  Flowsheets (Taken 6/21/2024 2024)  Absence of cardiac dysrhythmias or at baseline: Monitor cardiac rate and rhythm     Problem: Musculoskeletal - Adult  Goal: Return mobility to safest level of function  Outcome: Progressing  Flowsheets (Taken 6/21/2024 2024)  Return Mobility to Safest Level of Function:   Assess patient stability and activity tolerance for standing, transferring and ambulating with or without assistive devices   Assist with transfers and ambulation using safe patient handling equipment as

## 2024-06-22 NOTE — PROGRESS NOTES
Comprehensive Nutrition Assessment    Type and Reason for Visit:  Initial, Positive Nutrition Screen    Nutrition Recommendations/Plan:   Liberalize diet to carb control, no added salt to allow more menu items  Continue Glucerna with meals per patient's flavor preference, will monitor need for adjustments.  Recommend to continue nutrition supplements after discharge  Please record po intake in flow sheets of meals and supplements  Will monitor nutritional adequacy, nutrition-related labs, weights, BMs, and clinical progress       Malnutrition Assessment:  Malnutrition Status:  At risk for malnutrition (Comment) (06/22/24 8552)    Context:  Acute Illness     Findings of the 6 clinical characteristics of malnutrition:  Energy Intake:   (decreased so far this admission)  Weight Loss:   (weight has trended down 5 lbs so far this admission per flow sheets)     Body Fat Loss:  Unable to assess     Muscle Mass Loss:  Unable to assess    Fluid Accumulation:  Unable to assess     Strength:  Not Performed    Nutrition Assessment:    Patient admitted with A-fib with RVR.  History of CHF and Diabetes.   Currently on a cardiac, carb control diet.  Po intake not recorded much this admission, 2 meals recorded less than 25%.  Glucerna shakes also ordered, will monitor tolerance.  Constipation noted, Colace ordered on 6/21.    Nutrition Related Findings:    decreased appetite, nausea, constipation Wound Type: None       Current Nutrition Intake & Therapies:    Average Meal Intake: 26-50%, 1-25%  Average Supplements Intake: Unable to assess  ADULT DIET; Regular; 4 carb choices (60 gm/meal); Low Fat/Low Chol/High Fiber/2 gm Na  ADULT ORAL NUTRITION SUPPLEMENT; Breakfast, Lunch, Dinner; Diabetic Oral Supplement    Anthropometric Measures:  Height: 157.5 cm (5' 2\")  Ideal Body Weight (IBW): 110 lbs (50 kg)       Current Body Weight: 58.7 kg (129 lb 6.6 oz),   IBW. Weight Source: Standing Scale  Current BMI (kg/m2): 23.7                           BMI Categories: Normal Weight (BMI 18.5-24.9)    Estimated Daily Nutrient Needs:  Energy Requirements Based On: Kcal/kg  Weight Used for Energy Requirements: Current  Energy (kcal/day): 9675-8898 (30-35 kcal/58.7 kg)  Weight Used for Protein Requirements: Current  Protein (g/day): 76-88 (1.3-1.5 g/58.7 kg)  Method Used for Fluid Requirements: 1 ml/kcal  Fluid (ml/day):      Nutrition Diagnosis:   Inadequate energy intake related to  (decreased appetite) as evidenced by intake 26-50%, intake 0-25%    Nutrition Interventions:   Food and/or Nutrient Delivery: Modify Current Diet, Continue Oral Nutrition Supplement  Nutrition Education/Counseling:  (monitor need)  Coordination of Nutrition Care: Continue to monitor while inpatient       Goals:     Goals: PO intake 75% or greater       Nutrition Monitoring and Evaluation:      Food/Nutrient Intake Outcomes: Food and Nutrient Intake, Supplement Intake  Physical Signs/Symptoms Outcomes: Biochemical Data, Hemodynamic Status, Nutrition Focused Physical Findings, Meal Time Behavior    Discharge Planning:    Too soon to determine     PORTILLO HOLDER RD, LD  Contact: Office: [phone]; [place]: [phone]

## 2024-06-22 NOTE — PROGRESS NOTES
[facility]  Cardiology Consult Note        CC:      Shortness of breath             HPI:   This is a 68 y.o. female with a history of cardiomyopathy EF of 25% coronary artery bypass surgery about 25 years ago and recent stent to the SVG to the PDA and history of A-fib on Xarelto presents with shortness of not feeling well.  He also mentioned about having heart rate.  Several days.  Unable to keep things that her medicines will not work..  A home health nurse.  140 recommended the patient go to the ER     In the emergency room she was found to be tachycardic, normotensive with oxygenation above 90% on room air.  proBNP came 21,819.  CT scan was negative for PE.  Abdominal shows colitis and cholelithiasis.  She has been started on IV Cardizem for rate control.  The patient has been seen by surgery for colitis and the cholelithiasis and recommend treating her with antibiotics.  She is on Levaquin and metronidazole.    Atorvastatin 80, Clopidogrel 75, Digoxin, Jardiance, Furosemide 40, metoprolol 25, Midodrine, xarelto 20    Interval history  Status post cardioversion yesterday that was successful  On Lasix twice daily  Good diuresis    Past Medical History:   Diagnosis Date    CHF (congestive heart failure) ([facility])     DM2 (diabetes mellitus, type 2) ([facility])     Paroxysmal atrial fibrillation ([facility])       Past Surgical History:   Procedure Laterality Date    CORONARY ARTERY BYPASS GRAFT        Family History   Problem Relation Age of Onset    Asthma Mother     Cancer Mother     Heart Failure Mother     Hypertension Father     Diabetes Father     Heart Disease Father     Hypertension Brother     High Cholesterol Brother     Heart Disease Brother     Diabetes Paternal Grandmother       Social History     Tobacco Use    Smoking status: Some Days     Types: Cigarettes    Smokeless tobacco: Never   Substance Use Topics    Alcohol use: Yes     Comment: occasional    Drug use: Never      Allergies   Allergen Reactions  immediately following stent with very faint LCX collaterals     Patent LIMA-LAD with mild tortuosity   Mild non-obstructive SVG-OM disease   Severe obstructive proximal SVG-RPDA lesion with VAHID 2 flow     PCI to proximal SVG-RPDA with 3.5 x 26 JANIE, overlapping previously placed   JANIE.       ASSESSMENT AND PLAN:      Patient is presenting with shortness of breath  Status post cardioversion that was successful  On IV Lasix with good diuresis  Breathing is improving  proBNP has come down from 21,000  to 6000    Paroxysmal atrial fibrillation  Status post cardioversion  Remains in sinus rhythm  On loading dose of amiodarone  On Xarelto    Cardiomyopathy, probably ischemic  EF less than 25%  Presenting with heart failure  Diuresing well  On Toprol-XL and Jardiance  Unable to be on spironolactone or ACE inhibitor/ARB/ARNI because of hypotension  ICD has been discussed and will be pursued at     Coronary disease  Status post bypass in the remote past  Recent cath showed saphenous vein graft disease that was stented supplying the PDA  Is on clopidogrel and Xarelto    JAMES Clark M.D  6/22/2024

## 2024-06-23 LAB
ANION GAP SERPL CALCULATED.3IONS-SCNC: 12 MMOL/L (ref 3–16)
BACTERIA BLD CULT ORG #2: NORMAL
BACTERIA BLD CULT: NORMAL
BUN SERPL-MCNC: 10 MG/DL (ref 7–20)
CALCIUM SERPL-MCNC: 8.7 MG/DL (ref 8.3–10.6)
CHLORIDE SERPL-SCNC: 100 MMOL/L (ref 99–110)
CO2 SERPL-SCNC: 28 MMOL/L (ref 21–32)
CREAT SERPL-MCNC: 1.1 MG/DL (ref 0.6–1.2)
GFR SERPLBLD CREATININE-BSD FMLA CKD-EPI: 54 ML/MIN/{1.73_M2}
GLUCOSE BLD-MCNC: 116 MG/DL (ref 70–99)
GLUCOSE BLD-MCNC: 150 MG/DL (ref 70–99)
GLUCOSE BLD-MCNC: 171 MG/DL (ref 70–99)
GLUCOSE BLD-MCNC: 84 MG/DL (ref 70–99)
GLUCOSE SERPL-MCNC: 85 MG/DL (ref 70–99)
MAGNESIUM SERPL-MCNC: 1.4 MG/DL (ref 1.8–2.4)
MAGNESIUM SERPL-MCNC: 2.6 MG/DL (ref 1.8–2.4)
NT-PROBNP SERPL-MCNC: 4563 PG/ML (ref 0–124)
PERFORMED ON: ABNORMAL
PERFORMED ON: NORMAL
POTASSIUM SERPL-SCNC: 2.8 MMOL/L (ref 3.5–5.1)
POTASSIUM SERPL-SCNC: 4.2 MMOL/L (ref 3.5–5.1)
SODIUM SERPL-SCNC: 140 MMOL/L (ref 136–145)

## 2024-06-23 PROCEDURE — 84132 ASSAY OF SERUM POTASSIUM: CPT

## 2024-06-23 PROCEDURE — 6360000002 HC RX W HCPCS: Performed by: INTERNAL MEDICINE

## 2024-06-23 PROCEDURE — 6370000000 HC RX 637 (ALT 250 FOR IP): Performed by: INTERNAL MEDICINE

## 2024-06-23 PROCEDURE — 2580000003 HC RX 258: Performed by: INTERNAL MEDICINE

## 2024-06-23 PROCEDURE — 2060000000 HC ICU INTERMEDIATE R&B

## 2024-06-23 PROCEDURE — 99232 SBSQ HOSP IP/OBS MODERATE 35: CPT | Performed by: SURGERY

## 2024-06-23 PROCEDURE — 83735 ASSAY OF MAGNESIUM: CPT

## 2024-06-23 PROCEDURE — 99233 SBSQ HOSP IP/OBS HIGH 50: CPT | Performed by: INTERNAL MEDICINE

## 2024-06-23 PROCEDURE — 6370000000 HC RX 637 (ALT 250 FOR IP): Performed by: SURGERY

## 2024-06-23 PROCEDURE — 80048 BASIC METABOLIC PNL TOTAL CA: CPT

## 2024-06-23 PROCEDURE — 83880 ASSAY OF NATRIURETIC PEPTIDE: CPT

## 2024-06-23 PROCEDURE — 36415 COLL VENOUS BLD VENIPUNCTURE: CPT

## 2024-06-23 RX ORDER — MAGNESIUM CARB/ALUMINUM HYDROX 105-160MG
30 TABLET,CHEWABLE ORAL 2 TIMES DAILY
Status: DISCONTINUED | OUTPATIENT
Start: 2024-06-23 | End: 2024-06-23 | Stop reason: CLARIF

## 2024-06-23 RX ORDER — MAGNESIUM SULFATE IN WATER 40 MG/ML
2000 INJECTION, SOLUTION INTRAVENOUS ONCE
Status: COMPLETED | OUTPATIENT
Start: 2024-06-23 | End: 2024-06-23

## 2024-06-23 RX ORDER — POTASSIUM CHLORIDE 20 MEQ/1
40 TABLET, EXTENDED RELEASE ORAL ONCE
Status: COMPLETED | OUTPATIENT
Start: 2024-06-23 | End: 2024-06-23

## 2024-06-23 RX ORDER — POTASSIUM CHLORIDE 7.45 MG/ML
10 INJECTION INTRAVENOUS ONCE
Status: COMPLETED | OUTPATIENT
Start: 2024-06-23 | End: 2024-06-23

## 2024-06-23 RX ADMIN — Medication 15 ML: at 10:38

## 2024-06-23 RX ADMIN — DOCUSATE SODIUM 100 MG: 100 CAPSULE, LIQUID FILLED ORAL at 08:21

## 2024-06-23 RX ADMIN — POTASSIUM CHLORIDE 40 MEQ: 1500 TABLET, EXTENDED RELEASE ORAL at 14:18

## 2024-06-23 RX ADMIN — DULOXETINE HYDROCHLORIDE 20 MG: 20 CAPSULE, DELAYED RELEASE ORAL at 10:28

## 2024-06-23 RX ADMIN — GABAPENTIN 100 MG: 100 CAPSULE ORAL at 20:49

## 2024-06-23 RX ADMIN — RIVAROXABAN 20 MG: 20 TABLET, FILM COATED ORAL at 08:21

## 2024-06-23 RX ADMIN — MIDODRINE HYDROCHLORIDE 5 MG: 5 TABLET ORAL at 17:08

## 2024-06-23 RX ADMIN — MIDODRINE HYDROCHLORIDE 5 MG: 5 TABLET ORAL at 11:55

## 2024-06-23 RX ADMIN — POTASSIUM CHLORIDE 10 MEQ: 7.46 INJECTION, SOLUTION INTRAVENOUS at 10:22

## 2024-06-23 RX ADMIN — PIPERACILLIN AND TAZOBACTAM 3375 MG: 3; .375 INJECTION, POWDER, LYOPHILIZED, FOR SOLUTION INTRAVENOUS at 03:42

## 2024-06-23 RX ADMIN — TRAZODONE HYDROCHLORIDE 100 MG: 100 TABLET ORAL at 20:49

## 2024-06-23 RX ADMIN — FUROSEMIDE 40 MG: 10 INJECTION, SOLUTION INTRAMUSCULAR; INTRAVENOUS at 17:08

## 2024-06-23 RX ADMIN — MAGNESIUM SULFATE HEPTAHYDRATE 2000 MG: 40 INJECTION, SOLUTION INTRAVENOUS at 09:16

## 2024-06-23 RX ADMIN — ATORVASTATIN CALCIUM 20 MG: 20 TABLET, FILM COATED ORAL at 20:49

## 2024-06-23 RX ADMIN — FUROSEMIDE 40 MG: 10 INJECTION, SOLUTION INTRAMUSCULAR; INTRAVENOUS at 10:19

## 2024-06-23 RX ADMIN — MIDODRINE HYDROCHLORIDE 5 MG: 5 TABLET ORAL at 08:21

## 2024-06-23 RX ADMIN — Medication 10 ML: at 21:03

## 2024-06-23 RX ADMIN — GABAPENTIN 100 MG: 100 CAPSULE ORAL at 10:28

## 2024-06-23 RX ADMIN — FAMOTIDINE 10 MG: 20 TABLET, FILM COATED ORAL at 20:49

## 2024-06-23 RX ADMIN — AMIODARONE HYDROCHLORIDE 200 MG: 200 TABLET ORAL at 10:31

## 2024-06-23 RX ADMIN — DOCUSATE SODIUM 100 MG: 100 CAPSULE, LIQUID FILLED ORAL at 20:49

## 2024-06-23 RX ADMIN — CLOPIDOGREL BISULFATE 75 MG: 75 TABLET ORAL at 10:28

## 2024-06-23 RX ADMIN — PIPERACILLIN AND TAZOBACTAM 3375 MG: 3; .375 INJECTION, POWDER, LYOPHILIZED, FOR SOLUTION INTRAVENOUS at 11:57

## 2024-06-23 RX ADMIN — POTASSIUM CHLORIDE 40 MEQ: 1500 TABLET, EXTENDED RELEASE ORAL at 10:23

## 2024-06-23 RX ADMIN — EMPAGLIFLOZIN 10 MG: 10 TABLET, FILM COATED ORAL at 10:31

## 2024-06-23 RX ADMIN — GABAPENTIN 100 MG: 100 CAPSULE ORAL at 14:18

## 2024-06-23 RX ADMIN — PIPERACILLIN AND TAZOBACTAM 3375 MG: 3; .375 INJECTION, POWDER, LYOPHILIZED, FOR SOLUTION INTRAVENOUS at 20:48

## 2024-06-23 RX ADMIN — POTASSIUM CHLORIDE 10 MEQ: 7.46 INJECTION, SOLUTION INTRAVENOUS at 09:12

## 2024-06-23 RX ADMIN — FAMOTIDINE 10 MG: 20 TABLET, FILM COATED ORAL at 10:28

## 2024-06-23 ASSESSMENT — PAIN SCALES - GENERAL: PAINLEVEL_OUTOF10: 0

## 2024-06-23 NOTE — PROGRESS NOTES
Had sleep study completed at [facility], report being faxed to Rosalba Harper. Per patient, she was told she has minor sleep apnea but not requiring CPAP   Patient had medium soft brown BM.    Electronically signed by Radha Delacruz RN on 6/23/2024 at 10:56 AM

## 2024-06-23 NOTE — PROGRESS NOTES
[facility]  Cardiology Consult Note        CC:      Shortness of breath             HPI:   This is a 68 y.o. female with a history of cardiomyopathy EF of 25% coronary artery bypass surgery about 25 years ago and recent stent to the SVG to the PDA and history of A-fib on Xarelto presents with shortness of not feeling well.  He also mentioned about having heart rate.  Several days.  Unable to keep things that her medicines will not work..  A home health nurse.  140 recommended the patient go to the ER     In the emergency room she was found to be tachycardic, normotensive with oxygenation above 90% on room air.  proBNP came 21,819.  CT scan was negative for PE.  Abdominal shows colitis and cholelithiasis.  She has been started on IV Cardizem for rate control.  The patient has been seen by surgery for colitis and the cholelithiasis and recommend treating her with antibiotics.  She is on Levaquin and metronidazole.    Atorvastatin 80, Clopidogrel 75, Digoxin, Jardiance, Furosemide 40, metoprolol 25, Midodrine, xarelto 20    Interval history  Status post cardioversion that was successful  On amiodarone, remains on sinus on telemetry  On Lasix twice daily  Good diuresis  Sitting at the edge of the bed no complaints  Urine color is still quite dilute    Past Medical History:   Diagnosis Date    CHF (congestive heart failure) ([facility])     DM2 (diabetes mellitus, type 2) ([facility])     Paroxysmal atrial fibrillation ([facility])       Past Surgical History:   Procedure Laterality Date    CORONARY ARTERY BYPASS GRAFT        Family History   Problem Relation Age of Onset    Asthma Mother     Cancer Mother     Heart Failure Mother     Hypertension Father     Diabetes Father     Heart Disease Father     Hypertension Brother     High Cholesterol Brother     Heart Disease Brother     Diabetes Paternal Grandmother       Social History     Tobacco Use    Smoking status: Some Days     Types: Cigarettes    Smokeless tobacco: Never        Physical Examination:    /72   Pulse 51   Temp 97.6 °F (36.4 °C) (Oral)   Resp 14   Ht 1.575 m (5' 2\")   Wt 57.3 kg (126 lb 5.2 oz)   SpO2 96%   BMI 23.10 kg/m²    HEENT:  Face: Atraumatic, Conjunctiva: Pink; non icteric,  Mucous Memb:  Moist, No thyromegaly or Lymphadenopathy  Respiratory:  Resp Assessment: normal, Resp Auscultation: clear   Cardiovascular:  Auscultation: nl S1 & S2, Palpation:  Nl PMI; No heaves or thrills, JVP:  normal  Abdomen: Soft, non-tender, Normal bowel sounds,  No organomegaly  Extremities: No Cyanosis or Clubbing; Edema none  Neurological: Oriented to time, place, and person, Non-anxious  Psychiatric: Normal mood and affect  Skin: Warm and dry,  No rash seen      Current Facility-Administered Medications: mineral oil (KONDREMUL) 50 % emulsion 15 mL, 15 mL, Oral, BID  potassium chloride (KLOR-CON M) extended release tablet 40 mEq, 40 mEq, Oral, Once  polyethylene glycol (GLYCOLAX) packet 17 g, 17 g, Oral, BID  psyllium husk-aspartame (METAMUCIL FIBER) packet 1 packet, 1 packet, Oral, BID  atorvastatin (LIPITOR) tablet 20 mg, 20 mg, Oral, Nightly  docusate sodium (COLACE) capsule 100 mg, 100 mg, Oral, BID  clopidogrel (PLAVIX) tablet 75 mg, 75 mg, Oral, Daily  amiodarone (CORDARONE) tablet 200 mg, 200 mg, Oral, BID  piperacillin-tazobactam (ZOSYN) 3,375 mg in sodium chloride 0.9 % 50 mL IVPB (mini-bag), 3,375 mg, IntraVENous, Q8H  furosemide (LASIX) injection 40 mg, 40 mg, IntraVENous, BID  sodium chloride flush 0.9 % injection 5-40 mL, 5-40 mL, IntraVENous, 2 times per day  sodium chloride flush 0.9 % injection 5-40 mL, 5-40 mL, IntraVENous, PRN  0.9 % sodium chloride infusion, , IntraVENous, PRN  ondansetron (ZOFRAN-ODT) disintegrating tablet 4 mg, 4 mg, Oral, Q8H PRN **OR** ondansetron (ZOFRAN) injection 4 mg, 4 mg, IntraVENous, Q6H PRN  polyethylene glycol (GLYCOLAX) packet 17 g, 17 g, Oral, Daily PRN  acetaminophen (TYLENOL) tablet 650 mg, 650 mg, Oral, Q6H PRN

## 2024-06-23 NOTE — PROGRESS NOTES
Slept well, easily aroused for routine checks. No c/o excessive discomfort. In bed, call light in reach.

## 2024-06-23 NOTE — PLAN OF CARE
Problem: Discharge Planning  Goal: Discharge to home or other facility with appropriate resources  6/23/2024 1111 by Radha Quiñones RN  Outcome: Progressing  6/23/2024 0011 by Dany Sutherland RN  Outcome: Progressing  Flowsheets (Taken 6/22/2024 2002)  Discharge to home or other facility with appropriate resources: Identify barriers to discharge with patient and caregiver     Problem: Safety - Adult  Goal: Free from fall injury  6/23/2024 1111 by Radha Quiñones RN  Outcome: Progressing  6/23/2024 0011 by Dany Sutherland RN  Outcome: Progressing     Problem: Respiratory - Adult  Goal: Achieves optimal ventilation and oxygenation  6/23/2024 1111 by Radha Quiñones RN  Outcome: Progressing  6/23/2024 0011 by Dany Sutherland RN  Outcome: Progressing  Flowsheets (Taken 6/22/2024 2002)  Achieves optimal ventilation and oxygenation: Assess for changes in respiratory status     Problem: Cardiovascular - Adult  Goal: Maintains optimal cardiac output and hemodynamic stability  6/23/2024 1111 by Radha Quiñones RN  Outcome: Progressing  6/23/2024 0011 by Dany Sutherland RN  Outcome: Progressing  Flowsheets (Taken 6/22/2024 2002)  Maintains optimal cardiac output and hemodynamic stability: Monitor blood pressure and heart rate  Goal: Absence of cardiac dysrhythmias or at baseline  6/23/2024 1111 by Radha Quiñones RN  Outcome: Progressing  6/23/2024 0011 by Dany Sutherland RN  Outcome: Progressing  Flowsheets (Taken 6/22/2024 2002)  Absence of cardiac dysrhythmias or at baseline: Monitor cardiac rate and rhythm

## 2024-06-23 NOTE — PROGRESS NOTES
General and Vascular Surgery                                                           Daily Progress Note                                                             Jovi Leach MD    Pt Name: Gera Lamb  Medical Record Number: [id number]  Date of Birth 1955   Today's Date: 6/23/2024    ASSESSMENT/PLAN  A.fib with RVR, CHF, CAD with hx of CABG - continue xarelto, plavix.  Cardiology on board  CT scan showed mild colitis and cholelithiasis which is unchanged and mild hydrops of the gallbladder with questionable mild gallbladder wall thickening  No general surgery plans at this time for gallbladder. Treat colitis and possible gallbladder disease with antibiotics.   Tolerating regular diet.  Passing flatus, still no BM.  Will increase bowel regimen to facilitate BM.  Colace BID, miralax BID, metamucil BID.  Will add mineral oil BID    EDUCATION  Patient educated about their illness/diagnosis, stated above, and all questions answered. We discussed the importance of nutrition, medications they are taking, and healthy lifestyle.    SUBJECTIVE  Gera has improved from yesterday. Pain is well controlled. She denies any nausea.  She has passed flatus but has not had a BM. She is tolerating regular diet. Current activity is ad jose    OBJECTIVE  VITALS:  height is 1.575 m (5' 2\") and weight is 57.3 kg (126 lb 5.2 oz). Her oral temperature is 97.6 °F (36.4 °C). Her blood pressure is 109/70 and her pulse is 52. Her respiration is 12 and oxygen saturation is 93% (significant). VITALS:  /70   Pulse 52   Temp 97.6 °F (36.4 °C) (Oral)   Resp 12   Ht 1.575 m (5' 2\")   Wt 57.3 kg (126 lb 5.2 oz)   SpO2 (S) 93%   BMI 23.10 kg/m²   GENERAL: alert, no distress  LUNGS: normal respiratory effort, no accessory muscle use  HEART: normal rate and regular rhythm  ABDOMEN: soft, non-tender, non-distended  EXTREMITY: no cyanosis, clubbing  I/O last 3  completed shifts:  In: 694.9 [P.O.:600; IV Piggyback:94.9]  Out: 2800 [Urine:2800]  No intake/output data recorded.    LABS  Recent Labs     06/22/24  1124 06/23/24  0744    140   K 2.9* 2.8*   CL 97* 100   CO2 26 28   BUN 13 10   CREATININE 1.4* 1.1   MG 1.50* 1.40*   CALCIUM 8.8 8.7   AST 16  --    ALT <5*  --    BILITOT 0.6  --      CBC:   Lab Results   Component Value Date/Time    WBC 6.7 06/20/2024 06:23 AM    RBC 4.29 06/20/2024 06:23 AM    HGB 12.6 06/20/2024 06:23 AM    HCT 38.6 06/20/2024 06:23 AM    MCV 90.1 06/20/2024 06:23 AM    MCH 29.4 06/20/2024 06:23 AM    MCHC 32.7 06/20/2024 06:23 AM    RDW 15.6 06/20/2024 06:23 AM     06/20/2024 06:23 AM    MPV 9.3 06/20/2024 06:23 AM     CMP:    Lab Results   Component Value Date/Time     06/23/2024 07:44 AM    K 2.8 06/23/2024 07:44 AM    K 2.9 06/22/2024 11:24 AM     06/23/2024 07:44 AM    CO2 28 06/23/2024 07:44 AM    BUN 10 06/23/2024 07:44 AM    CREATININE 1.1 06/23/2024 07:44 AM    GFRAA >60 06/01/2012 06:10 AM    AGRATIO 1.1 06/22/2024 11:24 AM    LABGLOM 54 06/23/2024 07:44 AM    LABGLOM >60 01/28/2024 05:02 AM    GLUCOSE 85 06/23/2024 07:44 AM    PROT 7.4 05/31/2012 05:50 PM    CALCIUM 8.7 06/23/2024 07:44 AM    BILITOT 0.6 06/22/2024 11:24 AM    ALKPHOS 97 06/22/2024 11:24 AM    AST 16 06/22/2024 11:24 AM    ALT <5 06/22/2024 11:24 AM         Jovi Leach MD  Electronically signed 6/23/2024 at 9:25 AM

## 2024-06-23 NOTE — PROGRESS NOTES
Patient had medium brown BM.     Electronically signed by Radha Delacruz RN on 6/23/2024 at 9:06 AM

## 2024-06-23 NOTE — PROGRESS NOTES
V2.0    [facility] [place]      Name:  Gera Lamb /Age/Sex: 1955  (68 y.o. female)   MRN & CSN:  [id number] & [id number] Encounter Date/Time: 2024 1:55 PM EDT   Location:  [id number]-9090/5255-01 PCP: No primary care provider on file.     Attending:Alfredo Rankin MD       Hospital Day: 5    Assessment and Recommendations     Gera Lamb is a 68 y.o. female with medical history significant for chronic A-fib, CAD s/p CABG, tobacco dependency, type 2 diabetes mellitus, chronic systolic heart failure, insomnia, peripheral neuropathy who was admitted with shortness of breath and A-fib with RVR.       A-fib with RVR   Acute on chronic systolic heart failure  Acute respiratory failure with hypoxia due to pulmonary edema and possible pneumonia  Possible colitis with mild gallbladder wall thickening  Probable pneumonia  CAD, s/p CABG,s/p recent LHC at  with PCI to the proximal third of the saphenous vein graft to proximal right posterior descending  DM type II  Tobacco dependence  Hypokalemia/hyponatremia likely from IV diuresis      Plan:   Continue IV Lasix 40 mg twice daily and closely monitor renal functions and electrolyte  Strict I's and O's  Daily weight  Stat proBNP, reviewed trending down to 4563 <-- 5220 <---6041 <---61477  Appreciate cardiology follow-up input  Repeat his potassium and magnesium per protocol  Continue with amiodarone 200 mg daily, Lipitor 20 mg daily, Plavix 75 mg daily, digoxin 125 mcg daily  Cymbalta 20 mg daily  Jardiance 10 mg daily  Gabapentin 100 mg 3 times daily  Continue Lantus 10 unit at bedtime and sliding scale  Continue Toprol-XL 25 mg daily midodrine 5 mg p.o. 3 times daily  Continue with IV Zosyn day 3 and closely monitor renal functions and platelets  Bowel regimen  Labs in a.m.              Comment: Please note this report has been produced using speech recognition software and may contain errors related to that system including errors in grammar,  Reason for Exam: elevated d dimer. afib w rvr. FINDINGS: Pulmonary Arteries: There is motion artifact throughout the exam.  The central pulmonary arteries are well opacified with no filling defects in the upper lobes.  The subsegmental pulmonary arteries to the right lower lobe posteriorly are not well opacified and there is moderate consolidation and atelectasis around the arteries which is limiting the evaluation and the area.  There is no obvious filling defect or vessel cut off in the visualized pulmonary arteries Mediastinum: There are small lymph nodes along the AP window and pretracheal region measuring up to 17 mm.  The heart is mildly enlarged unchanged.  There are postop changes along the mediastinum and sternum.  There is mild calcified plaque throughout throughout the aorta which is mildly dilated throughout and is not well opacified. Lungs/pleura: There are hazy ground-glass opacities along the upper lobes extending inferiorly into the perihilar regions and both lung bases with moderate subsegmental atelectasis and consolidation along the lung bases posterolaterally extending anteriorly with no endobronchial lesion.  There is no pulmonary nodule or mass .  There are mild-to-moderate bibasilar pleural effusions posteriorly extending into the upper chest which is more prominent on the right and is more apparent. Upper Abdomen: See the CT abdomen report for full evaluation of the abdomen and pelvis Soft Tissues/Bones: There are moderate degenerative changes throughout the spine with no aggressive osseous lesion     Limited exam due to the prominent motion artifact and limited opacification of the pulmonary arteries along the lung bases posteriorly, especially on the right.  Within the limitations of the exam, no obvious acute pulmonary embolus can be seen.  Recommend clinical follow-up. Mild mediastinal adenopathy. Mild cardiomegaly with postop changes along the mediastinum and sternum. Hazy ground-glass

## 2024-06-23 NOTE — PLAN OF CARE
Problem: Discharge Planning  Goal: Discharge to home or other facility with appropriate resources  6/23/2024 0011 by Dany Sutherland RN  Outcome: Progressing  Flowsheets (Taken 6/22/2024 2002)  Discharge to home or other facility with appropriate resources: Identify barriers to discharge with patient and caregiver     Problem: Safety - Adult  Goal: Free from fall injury  6/23/2024 0011 by Dany Sutherland RN  Outcome: Progressing     Problem: Respiratory - Adult  Goal: Achieves optimal ventilation and oxygenation  6/23/2024 0011 by Dany Sutherland RN  Outcome: Progressing  Flowsheets (Taken 6/22/2024 2002)  Achieves optimal ventilation and oxygenation: Assess for changes in respiratory status     Problem: Cardiovascular - Adult  Goal: Maintains optimal cardiac output and hemodynamic stability  6/23/2024 0011 by Dany Sutherland RN  Outcome: Progressing  Flowsheets (Taken 6/22/2024 2002)  Maintains optimal cardiac output and hemodynamic stability: Monitor blood pressure and heart rate     Problem: Musculoskeletal - Adult  Goal: Maintain proper alignment of affected body part  6/23/2024 0011 by Dany Sutherland RN  Outcome: Progressing  Flowsheets (Taken 6/22/2024 2002)  Maintain proper alignment of affected body part: Support and protect limb and body alignment per provider's orders     Problem: Musculoskeletal - Adult  Goal: Return ADL status to a safe level of function  6/23/2024 0011 by Dany Sutherland RN  Outcome: Progressing  Flowsheets (Taken 6/22/2024 2002)  Return ADL Status to a Safe Level of Function: Administer medication as ordered     Problem: Metabolic/Fluid and Electrolytes - Adult  Goal: Hemodynamic stability and optimal renal function maintained  6/23/2024 0011 by Dany Sutherland RN  Outcome: Progressing  Flowsheets (Taken 6/22/2024 2002)  Hemodynamic stability and optimal renal function maintained: Monitor labs and assess for signs and symptoms of volume excess or

## 2024-06-24 VITALS
WEIGHT: 126.98 LBS | TEMPERATURE: 97.8 F | BODY MASS INDEX: 23.37 KG/M2 | OXYGEN SATURATION: 93 % | RESPIRATION RATE: 16 BRPM | HEIGHT: 62 IN | DIASTOLIC BLOOD PRESSURE: 59 MMHG | HEART RATE: 54 BPM | SYSTOLIC BLOOD PRESSURE: 104 MMHG

## 2024-06-24 LAB
GLUCOSE BLD-MCNC: 108 MG/DL (ref 70–99)
GLUCOSE BLD-MCNC: 132 MG/DL (ref 70–99)
PERFORMED ON: ABNORMAL
PERFORMED ON: ABNORMAL

## 2024-06-24 PROCEDURE — APPSS15 APP SPLIT SHARED TIME 0-15 MINUTES: Performed by: PHYSICIAN ASSISTANT

## 2024-06-24 PROCEDURE — 99231 SBSQ HOSP IP/OBS SF/LOW 25: CPT | Performed by: STUDENT IN AN ORGANIZED HEALTH CARE EDUCATION/TRAINING PROGRAM

## 2024-06-24 PROCEDURE — 6370000000 HC RX 637 (ALT 250 FOR IP): Performed by: INTERNAL MEDICINE

## 2024-06-24 PROCEDURE — 94680 O2 UPTK RST&XERS DIR SIMPLE: CPT

## 2024-06-24 PROCEDURE — APPNB15 APP NON BILLABLE TIME 0-15 MINS: Performed by: PHYSICIAN ASSISTANT

## 2024-06-24 PROCEDURE — 2700000000 HC OXYGEN THERAPY PER DAY

## 2024-06-24 PROCEDURE — 94761 N-INVAS EAR/PLS OXIMETRY MLT: CPT

## 2024-06-24 PROCEDURE — 6360000002 HC RX W HCPCS: Performed by: INTERNAL MEDICINE

## 2024-06-24 PROCEDURE — 2580000003 HC RX 258: Performed by: INTERNAL MEDICINE

## 2024-06-24 RX ORDER — AMOXICILLIN AND CLAVULANATE POTASSIUM 875; 125 MG/1; MG/1
1 TABLET, FILM COATED ORAL 2 TIMES DAILY
Qty: 4 TABLET | Refills: 0 | Status: SHIPPED | OUTPATIENT
Start: 2024-06-24 | End: 2024-06-24

## 2024-06-24 RX ORDER — AMOXICILLIN AND CLAVULANATE POTASSIUM 875; 125 MG/1; MG/1
1 TABLET, FILM COATED ORAL 2 TIMES DAILY
Qty: 20 TABLET | Refills: 0 | Status: SHIPPED | OUTPATIENT
Start: 2024-06-24 | End: 2024-07-04

## 2024-06-24 RX ADMIN — DULOXETINE HYDROCHLORIDE 20 MG: 20 CAPSULE, DELAYED RELEASE ORAL at 09:07

## 2024-06-24 RX ADMIN — MIDODRINE HYDROCHLORIDE 5 MG: 5 TABLET ORAL at 12:00

## 2024-06-24 RX ADMIN — PIPERACILLIN AND TAZOBACTAM 3375 MG: 3; .375 INJECTION, POWDER, LYOPHILIZED, FOR SOLUTION INTRAVENOUS at 03:15

## 2024-06-24 RX ADMIN — DIGOXIN 125 MCG: 125 TABLET ORAL at 09:07

## 2024-06-24 RX ADMIN — GABAPENTIN 100 MG: 100 CAPSULE ORAL at 14:45

## 2024-06-24 RX ADMIN — RIVAROXABAN 20 MG: 20 TABLET, FILM COATED ORAL at 09:06

## 2024-06-24 RX ADMIN — Medication 10 ML: at 09:06

## 2024-06-24 RX ADMIN — MIDODRINE HYDROCHLORIDE 5 MG: 5 TABLET ORAL at 09:07

## 2024-06-24 RX ADMIN — FUROSEMIDE 40 MG: 10 INJECTION, SOLUTION INTRAMUSCULAR; INTRAVENOUS at 09:06

## 2024-06-24 RX ADMIN — SODIUM CHLORIDE: 9 INJECTION, SOLUTION INTRAVENOUS at 12:01

## 2024-06-24 RX ADMIN — PIPERACILLIN AND TAZOBACTAM 3375 MG: 3; .375 INJECTION, POWDER, LYOPHILIZED, FOR SOLUTION INTRAVENOUS at 12:02

## 2024-06-24 RX ADMIN — GABAPENTIN 100 MG: 100 CAPSULE ORAL at 09:07

## 2024-06-24 RX ADMIN — ACETAMINOPHEN 325MG 650 MG: 325 TABLET ORAL at 09:07

## 2024-06-24 RX ADMIN — EMPAGLIFLOZIN 10 MG: 10 TABLET, FILM COATED ORAL at 09:07

## 2024-06-24 RX ADMIN — METOPROLOL SUCCINATE 25 MG: 25 TABLET, EXTENDED RELEASE ORAL at 09:07

## 2024-06-24 RX ADMIN — CLOPIDOGREL BISULFATE 75 MG: 75 TABLET ORAL at 09:07

## 2024-06-24 RX ADMIN — FAMOTIDINE 10 MG: 20 TABLET, FILM COATED ORAL at 09:07

## 2024-06-24 ASSESSMENT — PAIN - FUNCTIONAL ASSESSMENT: PAIN_FUNCTIONAL_ASSESSMENT: ACTIVITIES ARE NOT PREVENTED

## 2024-06-24 ASSESSMENT — PAIN SCALES - GENERAL
PAINLEVEL_OUTOF10: 0
PAINLEVEL_OUTOF10: 3
PAINLEVEL_OUTOF10: 0

## 2024-06-24 ASSESSMENT — PAIN DESCRIPTION - LOCATION: LOCATION: ABDOMEN

## 2024-06-24 ASSESSMENT — PAIN DESCRIPTION - ONSET: ONSET: AWAKENED FROM SLEEP

## 2024-06-24 ASSESSMENT — PAIN DESCRIPTION - DESCRIPTORS: DESCRIPTORS: ACHING

## 2024-06-24 ASSESSMENT — PAIN DESCRIPTION - PAIN TYPE: TYPE: ACUTE PAIN

## 2024-06-24 ASSESSMENT — PAIN SCALES - WONG BAKER: WONGBAKER_NUMERICALRESPONSE: NO HURT

## 2024-06-24 ASSESSMENT — PAIN DESCRIPTION - FREQUENCY: FREQUENCY: INTERMITTENT

## 2024-06-24 NOTE — PROGRESS NOTES
Home oxygen evaluation performed as ordered. Patient does not qualify at this time.     06/24/24 1312   Resting (Room Air)   SpO2 95   During Walk (Room Air)   SpO2 91   After Walk   SpO2 93   Does the Patient Qualify for Home O2 No   Does the Patient Need Portable Oxygen Tanks No

## 2024-06-24 NOTE — PROGRESS NOTES
General and Vascular Surgery                                                           Daily Progress Note                                                             Aj Morales PA-C    Pt Name: Gera Lamb  Medical Record Number: [id number]  Date of Birth 1955   Today's Date: 6/24/2024    ASSESSMENT/PLAN  A.fib with RVR, CHF, CAD with hx of CABG - continue xarelto, plavix.  Cardiology on board  CT scan showed mild colitis and cholelithiasis which is unchanged and mild hydrops of the gallbladder with questionable mild gallbladder wall thickening  No general surgery plans at this time for gallbladder. Treat colitis and possible gallbladder disease with antibiotics.   Tolerating regular diet.    +flatulence and multiple BM's. Continue bowel regimen. Colace BID, miralax BID, metamucil BID.      EDUCATION  Patient educated about their illness/diagnosis, stated above, and all questions answered. We discussed the importance of nutrition, medications they are taking, and healthy lifestyle.    SUBJECTIVE  Gera has improved from yesterday. Pain is well controlled. She denies any nausea.  She has passed flatus and has had multiple BM's. She is tolerating regular diet. Current activity is ad jose    OBJECTIVE  VITALS:  height is 1.575 m (5' 2\") and weight is 57.6 kg (126 lb 15.8 oz). Her oral temperature is 98 °F (36.7 °C). Her blood pressure is 104/60 and her pulse is 53. Her respiration is 16 and oxygen saturation is 94%. VITALS:  /60   Pulse 53   Temp 98 °F (36.7 °C) (Oral)   Resp 16   Ht 1.575 m (5' 2\")   Wt 57.6 kg (126 lb 15.8 oz)   SpO2 94%   BMI 23.23 kg/m²   GENERAL: alert, no distress  LUNGS: normal respiratory effort, no accessory muscle use  HEART: normal rate and regular rhythm  ABDOMEN: soft, non-tender, non-distended  EXTREMITY: no cyanosis, clubbing  I/O last 3 completed shifts:  In: 1155.9 [P.O.:960; IV Piggyback:195.9]  Out:

## 2024-06-24 NOTE — PLAN OF CARE
Problem: Respiratory - Adult  Goal: Achieves optimal ventilation and oxygenation  6/24/2024 0627 by Aspen Anglin RN  Outcome: Progressing     Problem: Cardiovascular - Adult  Goal: Maintains optimal cardiac output and hemodynamic stability  6/24/2024 0627 by Aspen Anglin, RN  Outcome: Progressing  Goal: Absence of cardiac dysrhythmias or at baseline  6/24/2024 0627 by Aspen Anglin RN  Outcome: Progressing

## 2024-06-24 NOTE — DISCHARGE SUMMARY
V2.0  Discharge Summary    Name:  Gera Lamb /Age/Sex: 1955 (68 y.o. female)   Admit Date: 2024  Discharge Date: 24    MRN & CSN:  [id number] & [id number] Encounter Date and Time 24 1:58 PM EDT    Attending:  Alfredo Rankin MD Discharging Provider: Alfredo Rankin MD       Discharge Diagnosis:   A-fib with RVR   Acute on chronic systolic heart failure  Acute respiratory failure with hypoxia due to pulmonary edema and possible pneumonia  Possible colitis with mild gallbladder wall thickening  Probable pneumonia  CAD, s/p CABG,s/p recent LHC at  with PCI to the proximal third of the saphenous vein graft to proximal right posterior descending  DM type II  Tobacco dependence  Hypokalemia/hyponatremia likely from IV diuresis          Hospital Course:     Brief HPI: Gera Lamb is a 68 y.o. female who presented with shortness of breath    Brief Problem Based Course:   Admitted with shortness of breath and found to have acute on chronic systolic heart failure with A-fib and RVR and possible colitis as well as pneumonia although cultures was negative.  Cardiology was consulted she was adequately diuresed.  Will transition to p.o. Augmentin for 10 more days which will cover for colitis/possible pneumonia/gallbladder disease.  General surgery has signed off  No ACE or ARB due to hypotension per cardiology can be reinstituted at outpatient  She is stable and is being discharged home with appropriate follow-up      The patient expressed appropriate understanding of, and agreement with the discharge recommendations, medications, and plan.     Consults this admission:  IP CONSULT TO HOSPITALIST  IP CONSULT TO CARDIOLOGY  IP CONSULT TO GENERAL SURGERY  IP CONSULT TO HOME CARE NEEDS    Comment: Please note this report has been produced using speech recognition software and may contain errors related to that system including errors in grammar, punctuation, and spelling, as well as words and phrases

## 2024-06-24 NOTE — DISCHARGE INSTR - COC
Continuity of Care Form    Patient Name: Gera Lamb   :  1955  MRN:  [id number]    Admit date:  2024  Discharge date:  24    Code Status Order: Full Code   Advance Directives:     Admitting Physician:  Chivo Toney MD  PCP: No primary care provider on file.    Discharging Nurse: benita  Discharging Hospital Unit/Room#: N3J-4184/5255-01  Discharging Unit Phone Number: [phone]    Emergency Contact:   Extended Emergency Contact Information  Primary Emergency Contact: Blayne Ross  [place]  Home Phone: [phone]  Mobile Phone: [phone]  Relation: Child    Past Surgical History:  Past Surgical History:   Procedure Laterality Date    CORONARY ARTERY BYPASS GRAFT         Immunization History:   Immunization History   Administered Date(s) Administered    COVID-19, MODERNA, ( formula), (age 12y+), IM, 50mcg/0.5mL 2023    COVID-19, PFIZER PURPLE top, DILUTE for use, (age 12 y+), 30mcg/0.3mL 2021, 2021, 2021       Active Problems:  Patient Active Problem List   Diagnosis Code    Colitis K52.9    Atrial fibrillation with RVR (HCC) I48.91    Coronary artery disease due to calcified coronary lesion I25.10, I25.84    Chronic systolic heart failure (HCC) I50.22    Hypotension I95.9    Tobacco abuse Z72.0    Hydrops of gallbladder K82.1    Anticoagulated Z79.01    Slow transit constipation K59.01       Isolation/Infection:   Isolation            No Isolation          Patient Infection Status       Infection Onset Added Last Indicated Last Indicated By Review Planned Expiration Resolved Resolved By    None active    Resolved    C-diff Rule Out  24 Sathish May   24 Sathish May                       Nurse Assessment:  Last Vital Signs: BP (!) 104/59   Pulse 54   Temp 97.8 °F (36.6 °C) (Oral)   Resp 16   Ht 1.575 m (5' 2\")   Wt 57.6 kg (126 lb 15.8 oz)   SpO2 93%   BMI 23.23 kg/m²     Last documented pain score (0-10

## 2024-06-24 NOTE — CARE COORDINATION
Case Management Discharge Note          Date / Time of Note: 6/24/2024 2:36 PM                  Patient Name: Gera Lamb   YOB: 1955  Diagnosis: Hydrops of gallbladder [K82.1]  Colitis [K52.9]  Hypoxia [R09.02]  Atrial fibrillation with RVR (HCC) [I48.91]  Elevated brain natriuretic peptide (BNP) level [R79.89]  Ground glass opacity present on imaging of lung [R91.8]  Nausea and vomiting, unspecified vomiting type [R11.2]   Date / Time: 6/19/2024  3:33 PM    Financial:  Payor: LEE MCMAHON [place] / [facility] / Product Type: *No Product type* /      Pharmacy:    [place], [place] - [address] [facility] - P [phone] - F [phone]  [address] [facility] [address]  Phone: [phone] Fax: [phone]    [id number] [address] [phone] - F [phone]  [address] [place] [id number]  Phone: [phone] Fax: [phone]      Assistance purchasing medications?: Potential Assistance Purchasing Medications: No  Assistance provided by Case Management: None at this time    DISCHARGE Disposition: Home- No Services Needed    Home Care:  Home Care ordered at discharge: Yes  Home Care Agency: [facility] - home health aide services  Orders faxed: n/a   Patient reports insurance home health aides.     Transportation:  Transportation PLAN for discharge: family   Mode of Transport: Private Car  Time of Transport: patient to coordinate with DANA       IMM Completed:   Yes, Case management has presented and reviewed [facility] letter #2.           .   Patient and/or family/POA verbalized understanding of their medicare rights and appeal process if needed. Patient and/or family/POA has signed, initialed and placed the date and time on IMM letter #2 on the the appropriate lines. Copy of letter offered and they are aware that the original copy of IMM letter #2 is available prior to discharge  from the paper chart on the unit.  Electronic documentation has been entered into epic for IMM letter #2 and original paper copy has been added to the paper chart at the nurses station.     Additional CM Notes: Discharge home with family and non-skilled home care.   Family to transport.   Patient states she will call to coordinate transport home.   Notified DANA Owen of the above.     The Plan for Transition of Care is related to the following treatment goals of Hydrops of gallbladder [K82.1]  Colitis [K52.9]  Hypoxia [R09.02]  Atrial fibrillation with RVR (HCC) [I48.91]  Elevated brain natriuretic peptide (BNP) level [R79.89]  Ground glass opacity present on imaging of lung [R91.8]  Nausea and vomiting, unspecified vomiting type [R11.2]      DEBORAH Rojo  [facility]   Case Management Department  Ph: [phone]   Electronically signed by DEBORAH Rojo on 6/24/2024 at 2:50 PM

## 2024-07-02 NOTE — PROGRESS NOTES
Physician Progress Note      PATIENT:               CHRIS PALOMO  CSN #:                  [id number]  :                       1955  ADMIT DATE:       2024 3:33 PM  DISCH DATE:        2024 5:31 PM  RESPONDING  PROVIDER #:        Alfredo Feliciano MD          QUERY TEXT:    Pt noted to have probable PNA. Vitals on arrival-/85, , RR 27,   temp 98.2, saturating 92%.Labs significant for lactic acid 1.7, 2.6, proBNP   406[address]. IV Zosyn given. If possible, please document in the progress notes and   discharge summary if you are evaluating and /or treating any of the following:  The medical record reflects the following:    Risk Factors: colitis, probable PNA  Clinical Indicators: lactic 2.6, , RR 27, PER gen surg note, \"No general   surgery plans at this time. Treat colitis and possible gallbladder disease   with antibiotics.\"  Treatment: NS bolus, Iv antibiotics, Blood cultures, chest xray, CT chest,   consults to general surgery  Options provided:  -- Sepsis, present on admission  -- Sepsis, present on admission, now resolved  -- Sepsis, developed following admission  -- Sepsis was ruled out  -- Other - I will add my own diagnosis  -- Disagree - Not applicable / Not valid  -- Disagree - Clinically unable to determine / Unknown  -- Refer to Clinical Documentation Reviewer    PROVIDER RESPONSE TEXT:    This patient has sepsis which was present on admission.    Query created by: Krista Mojica on 2024 9:23 AM      Electronically signed by:  Alfredo Feliciano MD 2024 12:04 PM

## 2025-05-27 NOTE — PROGRESS NOTES
V2.0    [facility] Progress Note      Name:  Gera Lamb /Age/Sex: 1955  (68 y.o. female)   MRN & CSN:  [id number] & [id number] Encounter Date/Time: 2024 1:55 PM EDT   Location:  [id number] PCP: No primary care provider on file.     Attending:Alfredo Rankin MD       Hospital Day: 4    Assessment and Recommendations     Gera Lamb is a 68 y.o. female with medical history significant for chronic A-fib, CAD s/p CABG, tobacco dependency, type 2 diabetes mellitus, chronic systolic heart failure, insomnia, peripheral neuropathy who was admitted with shortness of breath and A-fib with RVR.       A-fib with RVR   Acute on chronic systolic heart failure  Acute respiratory failure with hypoxia due to pulmonary edema and possible pneumonia  Possible colitis with mild gallbladder wall thickening  Probable pneumonia  CAD, s/p CABG,s/p recent LHC at  with PCI to the proximal third of the saphenous vein graft to proximal right posterior descending  DM type II  Tobacco dependence      Plan:   Continue IV Lasix 40 mg twice daily and closely monitor renal functions and electrolyte  Strict I's and O's  Daily weight  Stat proBNP, reviewed trending down to 5220 <---6041 <---95766  Appreciate cardiology follow-up input  Continue with amiodarone 200 mg daily, Lipitor 20 mg daily, Plavix 75 mg daily, digoxin 125 mcg daily  Cymbalta 20 mg daily  Jardiance 10 mg daily  Gabapentin 100 mg 3 times daily  Continue Lantus 10 unit at bedtime and sliding scale  Continue Toprol-XL 25 mg daily midodrine 5 mg p.o. 3 times daily  Continue with IV Zosyn day 3 and closely monitor renal functions and platelets  Bowel regimen  Labs in a.m.              Comment: Please note this report has been produced using speech recognition software and may contain errors related to that system including errors in grammar, punctuation, and spelling, as well as words and phrases that may be inappropriate. If there are any questions or  An intravascular ultrasound (IVUS) was performed in the circumflex artery using an (CATHETER US OPTICROSS 6HD 6FR 1.18MM 135CM 60MHZ IMG COR) ultrasound catheter. pulmonary arteries are well opacified with no filling defects in the upper lobes.  The subsegmental pulmonary arteries to the right lower lobe posteriorly are not well opacified and there is moderate consolidation and atelectasis around the arteries which is limiting the evaluation and the area.  There is no obvious filling defect or vessel cut off in the visualized pulmonary arteries Mediastinum: There are small lymph nodes along the AP window and pretracheal region measuring up to 17 mm.  The heart is mildly enlarged unchanged.  There are postop changes along the mediastinum and sternum.  There is mild calcified plaque throughout throughout the aorta which is mildly dilated throughout and is not well opacified. Lungs/pleura: There are hazy ground-glass opacities along the upper lobes extending inferiorly into the perihilar regions and both lung bases with moderate subsegmental atelectasis and consolidation along the lung bases posterolaterally extending anteriorly with no endobronchial lesion.  There is no pulmonary nodule or mass .  There are mild-to-moderate bibasilar pleural effusions posteriorly extending into the upper chest which is more prominent on the right and is more apparent. Upper Abdomen: See the CT abdomen report for full evaluation of the abdomen and pelvis Soft Tissues/Bones: There are moderate degenerative changes throughout the spine with no aggressive osseous lesion     Limited exam due to the prominent motion artifact and limited opacification of the pulmonary arteries along the lung bases posteriorly, especially on the right.  Within the limitations of the exam, no obvious acute pulmonary embolus can be seen.  Recommend clinical follow-up. Mild mediastinal adenopathy. Mild cardiomegaly with postop changes along the mediastinum and sternum. Hazy ground-glass opacities along the upper lobes extending throughout the lung bases which could represent early pneumonia  and/or pulmonary edema.